# Patient Record
Sex: MALE | Race: WHITE | NOT HISPANIC OR LATINO | Employment: FULL TIME | URBAN - METROPOLITAN AREA
[De-identification: names, ages, dates, MRNs, and addresses within clinical notes are randomized per-mention and may not be internally consistent; named-entity substitution may affect disease eponyms.]

---

## 2017-02-24 ENCOUNTER — OFFICE VISIT (OUTPATIENT)
Dept: MEDICAL GROUP | Facility: CLINIC | Age: 52
End: 2017-02-24
Payer: COMMERCIAL

## 2017-02-24 VITALS
HEART RATE: 82 BPM | HEIGHT: 71 IN | RESPIRATION RATE: 16 BRPM | DIASTOLIC BLOOD PRESSURE: 80 MMHG | OXYGEN SATURATION: 95 % | SYSTOLIC BLOOD PRESSURE: 140 MMHG | TEMPERATURE: 97.2 F | WEIGHT: 156 LBS | BODY MASS INDEX: 21.84 KG/M2

## 2017-02-24 DIAGNOSIS — J30.0 VASOMOTOR RHINITIS: ICD-10-CM

## 2017-02-24 DIAGNOSIS — R05.3 PERSISTENT COUGH FOR 3 WEEKS OR LONGER: ICD-10-CM

## 2017-02-24 PROCEDURE — 99214 OFFICE O/P EST MOD 30 MIN: CPT | Performed by: FAMILY MEDICINE

## 2017-02-24 RX ORDER — AZELASTINE 1 MG/ML
1 SPRAY, METERED NASAL 2 TIMES DAILY
Qty: 1 BOTTLE | Refills: 6 | Status: SHIPPED | OUTPATIENT
Start: 2017-02-24 | End: 2018-01-10

## 2017-02-24 ASSESSMENT — PATIENT HEALTH QUESTIONNAIRE - PHQ9: CLINICAL INTERPRETATION OF PHQ2 SCORE: 0

## 2017-02-24 NOTE — MR AVS SNAPSHOT
"        Jay Tenorioashleigh   2017 8:40 AM   Office Visit   MRN: [id number]    Department:  [facility]   Dept Phone:  [phone]    Description:  Male : 1965   Provider:  Jessica Fu M.D.           Reason for Visit     Cough           Allergies as of 2017     No Known Allergies      You were diagnosed with     Persistent cough for 3 weeks or longer   [[id number]]       Vasomotor rhinitis   [[id number]]         Vital Signs     Blood Pressure Pulse Temperature Respirations Height Weight    140/80 mmHg 82 36.2 °C (97.2 °F) 16 1.803 m (5' 11\") 70.761 kg (156 lb)    Body Mass Index Oxygen Saturation Smoking Status             21.77 kg/m2 95% Never Smoker          Basic Information     Date Of Birth Sex Race Ethnicity Preferred Language    1965 Male White Non- English      Problem List              ICD-10-CM Priority Class Noted - Resolved    HTN (hypertension) I10   2013 - Present    Dyslipidemia E78.5   2013 - Present      Health Maintenance        Date Due Completion Dates    IMM DTaP/Tdap/Td Vaccine (1 - Tdap) 1986 ---    IMM INFLUENZA (1) 3/1/2018 (Originally 2016) ---            Current Immunizations     No immunizations on file.      Below and/or attached are the medications your provider expects you to take. Review all of your home medications and newly ordered medications with your provider and/or pharmacist. Follow medication instructions as directed by your provider and/or pharmacist. Please keep your medication list with you and share with your provider. Update the information when medications are discontinued, doses are changed, or new medications (including over-the-counter products) are added; and carry medication information at all times in the event of emergency situations     Allergies:  No Known Allergies          Medications  Valid as of: 2017 -  9:24 AM    Generic Name Brand Name Tablet Size Instructions for use    Azelastine HCl " (Solution) ASTELIN 137 MCG/SPRAY Spray 1 Spray in nose 2 times a day.        Coenzyme Q10 (Cap) Coenzyme Q10 100 MG Take 1 Cap by mouth 1 time daily as needed.        Fluticasone Propionate (Suspension) FLONASE 50 MCG/ACT Spray 1 Spray in nose every day. Each Nostril        Losartan Potassium (Tab) COZAAR 50 MG TAKE 1 TABLET BY MOUTH EVERY DAY.        Pravastatin Sodium (Tab) PRAVACHOL 10 MG TAKE 1 TABLET BY MOUTH EVERY DAY.        Triamterene-HCTZ (Tab) MAXZIDE-25/DYAZIDE 37.5-25 MG TAKE 1 TABLET BY MOUTH EVERY DAY.        .                 Medicines prescribed today were sent to:     [facility]Y #[id number] - [place], [place] - [address]    [address] [place] [address]    Phone: [phone] Fax: [phone]    Open 24 Hours?: No      Medication refill instructions:       If your prescription bottle indicates you have medication refills left, it is not necessary to call your provider’s office. Please contact your pharmacy and they will refill your medication.    If your prescription bottle indicates you do not have any refills left, you may request refills at any time through one of the following ways: The online Zigmo system (except Urgent Care), by calling your provider’s office, or by asking your pharmacy to contact your provider’s office with a refill request. Medication refills are processed only during regular business hours and may not be available until the next business day. Your provider may request additional information or to have a follow-up visit with you prior to refilling your medication.   *Please Note: Medication refills are assigned a new Rx number when refilled electronically. Your pharmacy may indicate that no refills were authorized even though a new prescription for the same medication is available at the pharmacy. Please request the medicine by name with the pharmacy before contacting your provider for a refill.        Your To Do List     Future Labs/Procedures Complete By Expires      DX-CHEST-2 VIEWS  As directed 2/24/2018         Kidblogt Access Code: Activation code not generated  Current Vindicia Status: Active

## 2017-02-24 NOTE — PROGRESS NOTES
CC: Persistent cough, nasal drainage    HPI:   Jay presents today with the following.    1. Persistent cough for 3 weeks or longer  Cough since January 28.  He arrived home from [facility] on 1/26/17.  The cough is dry, not productive.  He is aware of postnasal drainage though he has had that intermittently before. Denies fever or chills. Denies malaise. Denies hematuria. He is a regular runner and finds that after his runs he has a fit of coughing that lasts many minutes. He notes no increase in fatigue or difficulty with his running. He denies any increase in shortness of breath. Denies any diaphoresis. Denies any unusual bruising.    2. Vasomotor rhinitis  He has had congestion and nasal drainage now for about a year. He is having this no matter where he travels or what climate he is in. It is nearly daily. There is sometimes a tingling nor sneezing feeling in the nose that seems to set this off. He saw Dr. Wong who began him on Flonase. He has been using the Flonase only intermittently a day or 2 a month. Discussed that this is going to have better effect if he uses it steadily for at least 10 days. Discussed the risks and pros and cons. Discussed alternative azelastine which would also need to be taken steadily over at least a couple weeks.      Patient Active Problem List    Diagnosis Date Noted   • Dyslipidemia 02/12/2013   • HTN (hypertension) 01/22/2013       Current Outpatient Prescriptions   Medication Sig Dispense Refill   • triamterene-hctz (MAXZIDE-25/DYAZIDE) 37.5-25 MG Tab TAKE 1 TABLET BY MOUTH EVERY DAY. 30 Tab 11   • losartan (COZAAR) 50 MG Tab TAKE 1 TABLET BY MOUTH EVERY DAY. 30 Tab 11   • pravastatin (PRAVACHOL) 10 MG TABS TAKE 1 TABLET BY MOUTH EVERY DAY. 30 Tab 10   • Coenzyme Q10 (CO Q-10) 100 MG CAPS Take 1 Cap by mouth 1 time daily as needed. 30 Each 0   • fluticasone (FLONASE) 50 MCG/ACT nasal spray Spray 1 Spray in nose every day. Each Nostril 16 g 11     No current  "facility-administered medications for this visit.         Allergies as of 02/24/2017   • (No Known Allergies)        ROS: As per HPI.    /80 mmHg  Pulse 82  Temp(Src) 36.2 °C (97.2 °F)  Resp 16  Ht 1.803 m (5' 11\")  Wt 70.761 kg (156 lb)  BMI 21.77 kg/m2  SpO2 95%    Physical Exam:  Gen:         Alert and oriented, No apparent distress.  Lucid and fluent.  HEENT:   EOMI, PERRLA, oropharynx well hydrated, posterior pharyngeal streaking.  Nasal mucosa moderate erythema, no ulcerations.  Neck:       Supple, no cervical adenopathy, no carotid bruits.    Lungs:     Clear to auscultation bilaterally, good air movement  CV:          Regular rate and rhythm. No murmurs, rubs or gallops. Heart sounds are crisp and regular.              Ext:          No clubbing, cyanosis, edema.      Assessment and Plan.   51 y.o. male with the following issues.    1. Persistent cough for 3 weeks or longer  Lungs were very clear and there is no suggestion clinically of pneumonia at this time.  This may be triggered primarily from the drainage. He will treat that but if that does not improve he will get the chest x-ray.  - DX-CHEST-2 VIEWS; Future    2. Vasomotor rhinitis  He will actually try his Flonase that he has at home for 10 days. If this does not improve the situation he will switch to the azelastine. He will let us know if she gets any epistaxis or if the symptoms are not improving.  - azelastine (ASTELIN) 137 MCG/SPRAY nasal spray; Spray 1 Spray in nose 2 times a day.  Dispense: 1 Bottle; Refill: 6            "

## 2017-04-14 RX ORDER — LOSARTAN POTASSIUM 50 MG/1
TABLET ORAL
Qty: 30 TAB | Refills: 11 | Status: SHIPPED | OUTPATIENT
Start: 2017-04-14 | End: 2018-05-12 | Stop reason: SDUPTHER

## 2017-08-31 ENCOUNTER — TELEPHONE (OUTPATIENT)
Dept: MEDICAL GROUP | Facility: CLINIC | Age: 52
End: 2017-08-31

## 2017-08-31 NOTE — TELEPHONE ENCOUNTER
Future Appointments       Provider Department Center    9/1/2017 9:40 AM Emory Wong D.O. [facility]          ESTABLISHED PATIENT PRE-VISIT PLANNING     Note: Patient will not be contacted if there is no indication to call. PT was not Contacted.    1.    Reviewed note from last office visit with PCP: YES Last office visit: 2/24/07    2.  If any orders were placed at last visit, do we have Results/Consult Notes?        •  Labs - Labs were not ordered at last office visit.        •  Imaging - Imaging ordered, NOT completed. Patient advised to complete prior to next appointment. CXR       •  Referrals - No referrals were ordered at last office visit.     3.  Immunizations were updated in Epic using WebIZ?: Epic matches WebIZ       •  Web Iz Recommendations:   MMR   Td (adult), adsorbed   CPOX (Varicella)   Influenza w/preserv.         4.  Patient is due for the following Health Maintenance Topics:   There are no preventive care reminders to display for this patient.        5.  Patient was not informed to arrive 15 min prior to their scheduled appointment and bring in their medication bottles.

## 2017-09-01 ENCOUNTER — OFFICE VISIT (OUTPATIENT)
Dept: MEDICAL GROUP | Facility: CLINIC | Age: 52
End: 2017-09-01
Payer: COMMERCIAL

## 2017-09-01 VITALS
BODY MASS INDEX: 21.29 KG/M2 | HEIGHT: 71 IN | OXYGEN SATURATION: 98 % | TEMPERATURE: 97.5 F | HEART RATE: 80 BPM | RESPIRATION RATE: 18 BRPM | SYSTOLIC BLOOD PRESSURE: 126 MMHG | WEIGHT: 152.1 LBS | DIASTOLIC BLOOD PRESSURE: 78 MMHG

## 2017-09-01 DIAGNOSIS — R19.7 DIARRHEA, UNSPECIFIED TYPE: ICD-10-CM

## 2017-09-01 DIAGNOSIS — Z12.11 SCREENING FOR COLON CANCER: ICD-10-CM

## 2017-09-01 DIAGNOSIS — Z12.5 SCREENING FOR PROSTATE CANCER: ICD-10-CM

## 2017-09-01 DIAGNOSIS — Z13.220 SCREENING CHOLESTEROL LEVEL: ICD-10-CM

## 2017-09-01 DIAGNOSIS — R63.4 WEIGHT LOSS: ICD-10-CM

## 2017-09-01 PROCEDURE — 99214 OFFICE O/P EST MOD 30 MIN: CPT | Performed by: INTERNAL MEDICINE

## 2017-09-06 ENCOUNTER — HOSPITAL ENCOUNTER (OUTPATIENT)
Dept: LAB | Facility: MEDICAL CENTER | Age: 52
End: 2017-09-06
Attending: INTERNAL MEDICINE
Payer: COMMERCIAL

## 2017-09-06 DIAGNOSIS — Z13.220 SCREENING CHOLESTEROL LEVEL: ICD-10-CM

## 2017-09-06 DIAGNOSIS — Z12.5 SCREENING FOR PROSTATE CANCER: ICD-10-CM

## 2017-09-06 DIAGNOSIS — R63.4 WEIGHT LOSS: ICD-10-CM

## 2017-09-06 LAB
ALBUMIN SERPL BCP-MCNC: 4.4 G/DL (ref 3.2–4.9)
ALBUMIN/GLOB SERPL: 1.4 G/DL
ALP SERPL-CCNC: 55 U/L (ref 30–99)
ALT SERPL-CCNC: 20 U/L (ref 2–50)
ANION GAP SERPL CALC-SCNC: 9 MMOL/L (ref 0–11.9)
AST SERPL-CCNC: 18 U/L (ref 12–45)
BASOPHILS # BLD AUTO: 1.3 % (ref 0–1.8)
BASOPHILS # BLD: 0.06 K/UL (ref 0–0.12)
BILIRUB SERPL-MCNC: 1.2 MG/DL (ref 0.1–1.5)
BUN SERPL-MCNC: 12 MG/DL (ref 8–22)
CALCIUM SERPL-MCNC: 9.8 MG/DL (ref 8.5–10.5)
CHLORIDE SERPL-SCNC: 103 MMOL/L (ref 96–112)
CHOLEST SERPL-MCNC: 189 MG/DL (ref 100–199)
CO2 SERPL-SCNC: 27 MMOL/L (ref 20–33)
CREAT SERPL-MCNC: 0.94 MG/DL (ref 0.5–1.4)
EOSINOPHIL # BLD AUTO: 0.09 K/UL (ref 0–0.51)
EOSINOPHIL NFR BLD: 2 % (ref 0–6.9)
ERYTHROCYTE [DISTWIDTH] IN BLOOD BY AUTOMATED COUNT: 39.4 FL (ref 35.9–50)
FASTING STATUS PATIENT QL REPORTED: NORMAL
GFR SERPL CREATININE-BSD FRML MDRD: >60 ML/MIN/1.73 M 2
GLOBULIN SER CALC-MCNC: 3.1 G/DL (ref 1.9–3.5)
GLUCOSE SERPL-MCNC: 85 MG/DL (ref 65–99)
HCT VFR BLD AUTO: 48.8 % (ref 42–52)
HDLC SERPL-MCNC: 42 MG/DL
HGB BLD-MCNC: 16.6 G/DL (ref 14–18)
IMM GRANULOCYTES # BLD AUTO: 0.01 K/UL (ref 0–0.11)
IMM GRANULOCYTES NFR BLD AUTO: 0.2 % (ref 0–0.9)
LDLC SERPL CALC-MCNC: 130 MG/DL
LYMPHOCYTES # BLD AUTO: 1.8 K/UL (ref 1–4.8)
LYMPHOCYTES NFR BLD: 40.2 % (ref 22–41)
MCH RBC QN AUTO: 29.1 PG (ref 27–33)
MCHC RBC AUTO-ENTMCNC: 34 G/DL (ref 33.7–35.3)
MCV RBC AUTO: 85.6 FL (ref 81.4–97.8)
MONOCYTES # BLD AUTO: 0.29 K/UL (ref 0–0.85)
MONOCYTES NFR BLD AUTO: 6.5 % (ref 0–13.4)
NEUTROPHILS # BLD AUTO: 2.23 K/UL (ref 1.82–7.42)
NEUTROPHILS NFR BLD: 49.8 % (ref 44–72)
NRBC # BLD AUTO: 0 K/UL
NRBC BLD AUTO-RTO: 0 /100 WBC
PLATELET # BLD AUTO: 228 K/UL (ref 164–446)
PMV BLD AUTO: 11.1 FL (ref 9–12.9)
POTASSIUM SERPL-SCNC: 3.9 MMOL/L (ref 3.6–5.5)
PROT SERPL-MCNC: 7.5 G/DL (ref 6–8.2)
PSA SERPL-MCNC: 1.09 NG/ML (ref 0–4)
RBC # BLD AUTO: 5.7 M/UL (ref 4.7–6.1)
SODIUM SERPL-SCNC: 139 MMOL/L (ref 135–145)
T4 FREE SERPL-MCNC: 1 NG/DL (ref 0.53–1.43)
TRIGL SERPL-MCNC: 86 MG/DL (ref 0–149)
TSH SERPL DL<=0.005 MIU/L-ACNC: 1.86 UIU/ML (ref 0.3–3.7)
WBC # BLD AUTO: 4.5 K/UL (ref 4.8–10.8)

## 2017-09-06 PROCEDURE — 84443 ASSAY THYROID STIM HORMONE: CPT

## 2017-09-06 PROCEDURE — 84153 ASSAY OF PSA TOTAL: CPT

## 2017-09-06 PROCEDURE — 80053 COMPREHEN METABOLIC PANEL: CPT

## 2017-09-06 PROCEDURE — 85025 COMPLETE CBC W/AUTO DIFF WBC: CPT

## 2017-09-06 PROCEDURE — 80061 LIPID PANEL: CPT

## 2017-09-06 PROCEDURE — 84439 ASSAY OF FREE THYROXINE: CPT

## 2017-09-06 PROCEDURE — 36415 COLL VENOUS BLD VENIPUNCTURE: CPT

## 2017-09-14 ENCOUNTER — HOSPITAL ENCOUNTER (OUTPATIENT)
Facility: MEDICAL CENTER | Age: 52
End: 2017-09-14
Attending: INTERNAL MEDICINE
Payer: COMMERCIAL

## 2017-09-14 DIAGNOSIS — R19.7 DIARRHEA, UNSPECIFIED TYPE: ICD-10-CM

## 2017-09-14 PROCEDURE — 87177 OVA AND PARASITES SMEARS: CPT

## 2017-09-14 PROCEDURE — 87493 C DIFF AMPLIFIED PROBE: CPT

## 2017-09-15 LAB
C DIFF DNA SPEC QL NAA+PROBE: NEGATIVE
C DIFF TOX GENS STL QL NAA+PROBE: NEGATIVE

## 2017-09-19 LAB
O+P SPEC MICRO: NORMAL
SIGNIFICANT IND 70042: NORMAL
SOURCE SOURCE: NORMAL

## 2017-10-09 ENCOUNTER — OFFICE VISIT (OUTPATIENT)
Dept: MEDICAL GROUP | Facility: CLINIC | Age: 52
End: 2017-10-09
Payer: COMMERCIAL

## 2017-10-09 VITALS
DIASTOLIC BLOOD PRESSURE: 88 MMHG | HEART RATE: 68 BPM | BODY MASS INDEX: 21.49 KG/M2 | HEIGHT: 71 IN | SYSTOLIC BLOOD PRESSURE: 135 MMHG | OXYGEN SATURATION: 97 % | TEMPERATURE: 97.9 F | RESPIRATION RATE: 16 BRPM | WEIGHT: 153.5 LBS

## 2017-10-09 DIAGNOSIS — Z12.11 SCREENING FOR COLON CANCER: ICD-10-CM

## 2017-10-09 DIAGNOSIS — R10.13 DYSPEPSIA: ICD-10-CM

## 2017-10-09 DIAGNOSIS — E78.5 DYSLIPIDEMIA: ICD-10-CM

## 2017-10-09 PROCEDURE — 99214 OFFICE O/P EST MOD 30 MIN: CPT | Performed by: INTERNAL MEDICINE

## 2017-10-09 NOTE — PROGRESS NOTES
CC: Jay Murphy is a 52 y.o. male is suffering from   Chief Complaint   Patient presents with   • Follow-Up         SUBJECTIVE:  1. Dyslipidemia  Patient's here for follow-up, continues to struggle with dyslipidemia. We've discussed CT cardiac scoring. Patient is very physically active, but has a strong family history of heart disease..     2. Screening for colon cancer  Patient need of screening for colon cancer referral written.     3. Dyspepsia  Patient with a history of dyspepsia we've discussed various options including watchful waiting, concerned about possible IBS discussed FODMAP diet with the patient        Past social, family, history: Single  Social History   Substance Use Topics   • Smoking status: Never Smoker   • Smokeless tobacco: Never Used   • Alcohol use No         MEDICATIONS:    Current Outpatient Prescriptions:   •  losartan (COZAAR) 50 MG Tab, TAKE 1 TABLET BY MOUTH EVERY DAY., Disp: 30 Tab, Rfl: 11  •  triamterene-hctz (MAXZIDE-25/DYAZIDE) 37.5-25 MG Tab, TAKE 1 TABLET BY MOUTH EVERY DAY., Disp: 30 Tab, Rfl: 11  •  azelastine (ASTELIN) 137 MCG/SPRAY nasal spray, Spray 1 Spray in nose 2 times a day., Disp: 1 Bottle, Rfl: 6  •  pravastatin (PRAVACHOL) 10 MG TABS, TAKE 1 TABLET BY MOUTH EVERY DAY., Disp: 30 Tab, Rfl: 10  •  Coenzyme Q10 (CO Q-10) 100 MG CAPS, Take 1 Cap by mouth 1 time daily as needed., Disp: 30 Each, Rfl: 0  •  fluticasone (FLONASE) 50 MCG/ACT nasal spray, Spray 1 Spray in nose every day. Each Nostril, Disp: 16 g, Rfl: 11    PROBLEMS:  Patient Active Problem List    Diagnosis Date Noted   • Dyslipidemia 02/12/2013   • HTN (hypertension) 01/22/2013       REVIEW OF SYSTEMS:  Gen.:  No Nausea, Vomiting, fever, Chills.  Heart: No chest pain.  Lungs:  No shortness of Breath.  Psychological: Pantera unusual Anxiety depression     PHYSICAL EXAM   Constitutional: Alert, cooperative, not in acute distress.  Cardiovascular:  Rate Rhythm is regular without murmurs rubs clicks.     Thorax  "& Lungs: Clear to auscultation, no wheezing, rhonchi, or rales  HENT: Normocephalic, Atraumatic.  Eyes: PERRLA, EOMI, Conjunctiva normal.   Neck: Trachia is midline no swelling of the thyroid.   Lymphatic: No lymphadenopathy noted.   Abdomin: Soft non-tender, no rebound, no guarding.   Neurologic: Alert & oriented x 3, cranial nerves II through XII are intact, Normal motor function, Normal sensory function, No focal deficits noted.   Psychiatric: Affect normal, Judgment normal, Mood normal.     VITAL SIGNS:/88   Pulse 68   Temp 36.6 °C (97.9 °F)   Resp 16   Ht 1.803 m (5' 11\")   Wt 69.6 kg (153 lb 8 oz)   SpO2 97%   BMI 21.41 kg/m²     Labs: Reviewed    Assessment:                                                     Plan:    1. Dyslipidemia  CT cardiac scoring  - CT-CARDIAC SCORING; Future    2. Screening for colon cancer  Referral rewritten  - REFERRAL TO GI FOR COLONOSCOPY    3. Dyspepsia  Patient is being evaluated by gastroenterology will await the results        "

## 2017-10-23 ENCOUNTER — HOSPITAL ENCOUNTER (OUTPATIENT)
Dept: RADIOLOGY | Facility: MEDICAL CENTER | Age: 52
End: 2017-10-23
Attending: INTERNAL MEDICINE
Payer: COMMERCIAL

## 2017-10-23 DIAGNOSIS — E78.5 DYSLIPIDEMIA: ICD-10-CM

## 2017-10-23 PROCEDURE — 4410556 CT-CARDIAC SCORING

## 2017-10-23 RX ORDER — PRAVASTATIN SODIUM 20 MG
20 TABLET ORAL DAILY
Qty: 90 TAB | Refills: 3 | Status: SHIPPED | OUTPATIENT
Start: 2017-10-23 | End: 2018-01-10

## 2017-11-06 DIAGNOSIS — I10 ESSENTIAL HYPERTENSION: ICD-10-CM

## 2017-11-06 RX ORDER — TRIAMTERENE AND HYDROCHLOROTHIAZIDE 37.5; 25 MG/1; MG/1
TABLET ORAL
Qty: 30 TAB | Refills: 11 | Status: SHIPPED | OUTPATIENT
Start: 2017-11-06 | End: 2018-12-07 | Stop reason: SDUPTHER

## 2017-12-20 ENCOUNTER — OFFICE VISIT (OUTPATIENT)
Dept: MEDICAL GROUP | Facility: CLINIC | Age: 52
End: 2017-12-20
Payer: COMMERCIAL

## 2017-12-20 VITALS
OXYGEN SATURATION: 96 % | WEIGHT: 153.3 LBS | DIASTOLIC BLOOD PRESSURE: 68 MMHG | SYSTOLIC BLOOD PRESSURE: 134 MMHG | RESPIRATION RATE: 18 BRPM | HEART RATE: 90 BPM | TEMPERATURE: 97.8 F | HEIGHT: 71 IN | BODY MASS INDEX: 21.46 KG/M2

## 2017-12-20 DIAGNOSIS — I10 HYPERTENSION, UNSPECIFIED TYPE: ICD-10-CM

## 2017-12-20 DIAGNOSIS — E78.5 DYSLIPIDEMIA: ICD-10-CM

## 2017-12-20 DIAGNOSIS — T75.3XXA SEA SICKNESS, INITIAL ENCOUNTER: ICD-10-CM

## 2017-12-20 DIAGNOSIS — Z78.9 STATIN INTOLERANCE: ICD-10-CM

## 2017-12-20 PROCEDURE — 99214 OFFICE O/P EST MOD 30 MIN: CPT | Performed by: INTERNAL MEDICINE

## 2017-12-20 RX ORDER — SCOLOPAMINE TRANSDERMAL SYSTEM 1 MG/1
1 PATCH, EXTENDED RELEASE TRANSDERMAL
Qty: 4 PATCH | Refills: 0 | Status: SHIPPED | OUTPATIENT
Start: 2017-12-20 | End: 2019-08-27

## 2017-12-20 NOTE — PROGRESS NOTES
CC: Jay Murphy is a 52 y.o. male is suffering from No chief complaint on file.        SUBJECTIVE:  1. Hypertension, unspecified type  Patient's here for follow-up continues to suffer from hypertension, is clinically stable    2. Dyslipidemia  Patient and I discussed these having problems with his cholesterol, has an elevated CT cardiac score of recommended we be seen by cardiology    3. Statin intolerance  Patient was statin intolerance has problems with musculoskeletal pain associated with use of Pravachol    4. Sea sickness, initial encounter  Patient will be traveling to [place], scopolamine patch written for        Past social, family, history: Single  Social History   Substance Use Topics   • Smoking status: Never Smoker   • Smokeless tobacco: Never Used   • Alcohol use No         MEDICATIONS:    Current Outpatient Prescriptions:   •  scopolamine (TRANSDERM-SCOP) 1 MG/3DAYS PATCH 72 HR, Apply 1 Patch to skin as directed every 72 hours., Disp: 4 Patch, Rfl: 0  •  triamterene-hctz (MAXZIDE-25/DYAZIDE) 37.5-25 MG Tab, TAKE 1 TABLET BY MOUTH EVERY DAY., Disp: 30 Tab, Rfl: 11  •  losartan (COZAAR) 50 MG Tab, TAKE 1 TABLET BY MOUTH EVERY DAY., Disp: 30 Tab, Rfl: 11  •  pravastatin (PRAVACHOL) 20 MG Tab, Take 1 Tab by mouth every day., Disp: 90 Tab, Rfl: 3  •  azelastine (ASTELIN) 137 MCG/SPRAY nasal spray, Spray 1 Spray in nose 2 times a day., Disp: 1 Bottle, Rfl: 6  •  Coenzyme Q10 (CO Q-10) 100 MG CAPS, Take 1 Cap by mouth 1 time daily as needed., Disp: 30 Each, Rfl: 0  •  fluticasone (FLONASE) 50 MCG/ACT nasal spray, Spray 1 Spray in nose every day. Each Nostril, Disp: 16 g, Rfl: 11    PROBLEMS:  Patient Active Problem List    Diagnosis Date Noted   • Statin intolerance 12/20/2017   • Dyslipidemia 02/12/2013   • HTN (hypertension) 01/22/2013       REVIEW OF SYSTEMS:  Gen.:  No Nausea, Vomiting, fever, Chills.  Heart: No chest pain.  Lungs:  No shortness of Breath.  Psychological: Pantera unusual  "Anxiety depression     PHYSICAL EXAM   Constitutional: Alert, cooperative, not in acute distress.  Cardiovascular:  Rate Rhythm is regular without murmurs rubs clicks.     Thorax & Lungs: Clear to auscultation, no wheezing, rhonchi, or rales  HENT: Normocephalic, Atraumatic.  Eyes: PERRLA, EOMI, Conjunctiva normal.   Neck: Trachia is midline no swelling of the thyroid.   Lymphatic: No lymphadenopathy noted.   Neurologic: Alert & oriented x 3, cranial nerves II through XII are intact, Normal motor function, Normal sensory function, No focal deficits noted.   Psychiatric: Affect normal, Judgment normal, Mood normal.     VITAL SIGNS:/68   Pulse 90   Temp 36.6 °C (97.8 °F)   Resp 18   Ht 1.803 m (5' 11\")   Wt 69.5 kg (153 lb 4.8 oz)   SpO2 96%   BMI 21.38 kg/m²     Labs: Reviewed    Assessment:                                                     Plan:    1. Hypertension, unspecified type  Hypertension clinically stable    2. Dyslipidemia  Elevated cholesterol with high CT cardiac score referral written to cardiology  - REFERRAL TO CARDIOLOGY    3. Statin intolerance  Patient is unable to tolerate Pravachol will need additional medical therapy initiated  - REFERRAL TO CARDIOLOGY    4. Sea sickness, initial encounter  Patient is to follow-up at a travel clinic for which she has an appointment today, patient is to be given scopolamine patches  - scopolamine (TRANSDERM-SCOP) 1 MG/3DAYS PATCH 72 HR; Apply 1 Patch to skin as directed every 72 hours.  Dispense: 4 Patch; Refill: 0        "

## 2018-01-10 ENCOUNTER — OFFICE VISIT (OUTPATIENT)
Dept: CARDIOLOGY | Facility: MEDICAL CENTER | Age: 53
End: 2018-01-10
Payer: COMMERCIAL

## 2018-01-10 VITALS
BODY MASS INDEX: 20.59 KG/M2 | HEIGHT: 72 IN | SYSTOLIC BLOOD PRESSURE: 120 MMHG | WEIGHT: 152 LBS | HEART RATE: 84 BPM | DIASTOLIC BLOOD PRESSURE: 80 MMHG

## 2018-01-10 DIAGNOSIS — I10 ESSENTIAL HYPERTENSION: ICD-10-CM

## 2018-01-10 DIAGNOSIS — I25.10 CORONARY ARTERY CALCIFICATION: ICD-10-CM

## 2018-01-10 DIAGNOSIS — E78.5 DYSLIPIDEMIA: ICD-10-CM

## 2018-01-10 DIAGNOSIS — I25.84 CORONARY ARTERY CALCIFICATION: ICD-10-CM

## 2018-01-10 LAB — EKG IMPRESSION: NORMAL

## 2018-01-10 PROCEDURE — 99203 OFFICE O/P NEW LOW 30 MIN: CPT | Performed by: INTERNAL MEDICINE

## 2018-01-10 PROCEDURE — 93000 ELECTROCARDIOGRAM COMPLETE: CPT | Performed by: INTERNAL MEDICINE

## 2018-01-10 RX ORDER — ROSUVASTATIN CALCIUM 5 MG/1
5 TABLET, COATED ORAL EVERY EVENING
Qty: 30 TAB | Refills: 11 | Status: SHIPPED | OUTPATIENT
Start: 2018-01-10 | End: 2019-06-11

## 2018-01-10 ASSESSMENT — ENCOUNTER SYMPTOMS
NERVOUS/ANXIOUS: 0
COUGH: 0
BRUISES/BLEEDS EASILY: 0
NAUSEA: 0
CHILLS: 0
SHORTNESS OF BREATH: 0
DIZZINESS: 0
MYALGIAS: 0
BLURRED VISION: 0
EYE DISCHARGE: 0
PND: 0
HEADACHES: 0
FEVER: 0
DEPRESSION: 0
HEARTBURN: 0
PALPITATIONS: 0

## 2018-01-10 NOTE — LETTER
[facility]   [address]   [address]  [place] [address]  Phone: [phone]  Fax: [phone]              Jay Murphy  1965    Encounter Date: 1/10/2018    Jay Schwartz M.D.          PROGRESS NOTE:  Subjective:   Jay Murphy is a 52 y.o. male who presents today     Chief Complaint: I have high cholesterol    This patient seen in consultation at the request of Dr. Wong for a coronary artery calcification with a score of 356 and LDL cholesterol of 130.  In the past has tried pravastatin 10 mg per day and after several weeks he developed left forearm musculoskeletal pain. Pravastatin was stopped. It took 2-3 months for the arm pain to disappear.  He is willing to try different statin.  Physically very active including frequent episodes of jogging several times a week.  No symptoms of heart disease.  Family history: Father required bypass surgery in his mid 60s  Social history: No tobacco no illicit drugs. He is a  at [employer]. Exercise consists of running    Past Medical History:   Diagnosis Date   • Hyperlipidemia    • Hypertension      History reviewed. No pertinent surgical history.  History reviewed. No pertinent family history.  History   Smoking Status   • Never Smoker   Smokeless Tobacco   • Never Used     No Known Allergies  Outpatient Encounter Prescriptions as of 1/10/2018   Medication Sig Dispense Refill   • rosuvastatin (CRESTOR) 5 MG Tab Take 1 Tab by mouth every evening. 30 Tab 11   • scopolamine (TRANSDERM-SCOP) 1 MG/3DAYS PATCH 72 HR Apply 1 Patch to skin as directed every 72 hours. 4 Patch 0   • triamterene-hctz (MAXZIDE-25/DYAZIDE) 37.5-25 MG Tab TAKE 1 TABLET BY MOUTH EVERY DAY. 30 Tab 11   • losartan (COZAAR) 50 MG Tab TAKE 1 TABLET BY MOUTH EVERY DAY. 30 Tab 11   • [DISCONTINUED] pravastatin (PRAVACHOL) 20 MG Tab Take 1 Tab by mouth every day. 90 Tab 3   • [DISCONTINUED]  azelastine (ASTELIN) 137 MCG/SPRAY nasal spray Simpson 1 Spray in nose 2 times a day. 1 Bottle 6   • Coenzyme Q10 (CO Q-10) 100 MG CAPS Take 1 Cap by mouth 1 time daily as needed. 30 Each 0   • fluticasone (FLONASE) 50 MCG/ACT nasal spray Spray 1 Spray in nose every day. Each Nostril 16 g 11     No facility-administered encounter medications on file as of 1/10/2018.      Review of Systems   Constitutional: Negative for chills, fever and malaise/fatigue.   Eyes: Negative for blurred vision and discharge.   Respiratory: Negative for cough and shortness of breath.    Cardiovascular: Negative for chest pain, palpitations, leg swelling and PND.   Gastrointestinal: Negative for heartburn and nausea.   Genitourinary: Negative for dysuria and urgency.   Musculoskeletal: Negative for myalgias.   Skin: Negative for itching and rash.   Neurological: Negative for dizziness and headaches.   Endo/Heme/Allergies: Negative for environmental allergies. Does not bruise/bleed easily.   Psychiatric/Behavioral: Negative for depression. The patient is not nervous/anxious.         Objective:   /80   Pulse 84   Ht 1.829 m (6')   Wt 68.9 kg (152 lb)   BMI 20.61 kg/m²      Physical Exam   Constitutional: He is oriented to person, place, and time. He appears well-developed and well-nourished.   HENT:   Head: Normocephalic and atraumatic.   Eyes: Conjunctivae and EOM are normal. No scleral icterus.   Neck: Neck supple. No JVD present. No thyromegaly present.   Cardiovascular: Normal rate, regular rhythm and normal heart sounds.  Exam reveals no gallop and no friction rub.    No murmur heard.  Pulmonary/Chest: Effort normal and breath sounds normal. No respiratory distress. He has no wheezes. He has no rales. He exhibits no tenderness.   Abdominal: Soft. Bowel sounds are normal. He exhibits no distension and no mass. There is no tenderness.   Neurological: He is alert and oriented to person, place, and time. Coordination normal.   Skin:  Skin is warm and dry. No rash noted. No pallor.   Psychiatric: He has a normal mood and affect. His behavior is normal. Judgment and thought content normal.       Assessment:     1. Essential hypertension  EKG   2. Dyslipidemia  CPK - TOTAL SERUM    COMP METABOLIC PANEL    LIPID PANEL   3. Coronary artery calcification         Medical Decision Making:  Today's Assessment / Status / Plan:   We discussed various options for other statins.  We have agreed that he'll try Crestor 5 mg every other day.  Lab work including CPK in 2 months.  Consider CAT scan in 2-4 years.  No indications for stress testing at this time.  I do not plan to follow him routinely.  Thank you for this consultation      Emory Wong D.O.  [address]  [place] [address]  VIA In Basket

## 2018-01-10 NOTE — PROGRESS NOTES
Subjective:   Jay Murphy is a 52 y.o. male who presents today     Chief Complaint: I have high cholesterol    This patient seen in consultation at the request of Dr. Wong for a coronary artery calcification with a score of 356 and LDL cholesterol of 130.  In the past has tried pravastatin 10 mg per day and after several weeks he developed left forearm musculoskeletal pain. Pravastatin was stopped. It took 2-3 months for the arm pain to disappear.  He is willing to try different statin.  Physically very active including frequent episodes of jogging several times a week.  No symptoms of heart disease.  Family history: Father required bypass surgery in his mid 60s  Social history: No tobacco no illicit drugs. He is a  at [employer]. Exercise consists of running    Past Medical History:   Diagnosis Date   • Hyperlipidemia    • Hypertension      History reviewed. No pertinent surgical history.  History reviewed. No pertinent family history.  History   Smoking Status   • Never Smoker   Smokeless Tobacco   • Never Used     No Known Allergies  Outpatient Encounter Prescriptions as of 1/10/2018   Medication Sig Dispense Refill   • rosuvastatin (CRESTOR) 5 MG Tab Take 1 Tab by mouth every evening. 30 Tab 11   • scopolamine (TRANSDERM-SCOP) 1 MG/3DAYS PATCH 72 HR Apply 1 Patch to skin as directed every 72 hours. 4 Patch 0   • triamterene-hctz (MAXZIDE-25/DYAZIDE) 37.5-25 MG Tab TAKE 1 TABLET BY MOUTH EVERY DAY. 30 Tab 11   • losartan (COZAAR) 50 MG Tab TAKE 1 TABLET BY MOUTH EVERY DAY. 30 Tab 11   • [DISCONTINUED] pravastatin (PRAVACHOL) 20 MG Tab Take 1 Tab by mouth every day. 90 Tab 3   • [DISCONTINUED] azelastine (ASTELIN) 137 MCG/SPRAY nasal spray [place] 1 Spray in nose 2 times a day. 1 Bottle 6   • Coenzyme Q10 (CO Q-10) 100 MG CAPS Take 1 Cap by mouth 1 time daily as needed. 30 Each 0   • fluticasone (FLONASE) 50 MCG/ACT nasal spray Spray 1 Spray in nose every day. Each Nostril 16 g  11     No facility-administered encounter medications on file as of 1/10/2018.      Review of Systems   Constitutional: Negative for chills, fever and malaise/fatigue.   Eyes: Negative for blurred vision and discharge.   Respiratory: Negative for cough and shortness of breath.    Cardiovascular: Negative for chest pain, palpitations, leg swelling and PND.   Gastrointestinal: Negative for heartburn and nausea.   Genitourinary: Negative for dysuria and urgency.   Musculoskeletal: Negative for myalgias.   Skin: Negative for itching and rash.   Neurological: Negative for dizziness and headaches.   Endo/Heme/Allergies: Negative for environmental allergies. Does not bruise/bleed easily.   Psychiatric/Behavioral: Negative for depression. The patient is not nervous/anxious.         Objective:   /80   Pulse 84   Ht 1.829 m (6')   Wt 68.9 kg (152 lb)   BMI 20.61 kg/m²     Physical Exam   Constitutional: He is oriented to person, place, and time. He appears well-developed and well-nourished.   HENT:   Head: Normocephalic and atraumatic.   Eyes: Conjunctivae and EOM are normal. No scleral icterus.   Neck: Neck supple. No JVD present. No thyromegaly present.   Cardiovascular: Normal rate, regular rhythm and normal heart sounds.  Exam reveals no gallop and no friction rub.    No murmur heard.  Pulmonary/Chest: Effort normal and breath sounds normal. No respiratory distress. He has no wheezes. He has no rales. He exhibits no tenderness.   Abdominal: Soft. Bowel sounds are normal. He exhibits no distension and no mass. There is no tenderness.   Neurological: He is alert and oriented to person, place, and time. Coordination normal.   Skin: Skin is warm and dry. No rash noted. No pallor.   Psychiatric: He has a normal mood and affect. His behavior is normal. Judgment and thought content normal.       Assessment:     1. Essential hypertension  EKG   2. Dyslipidemia  CPK - TOTAL SERUM    COMP METABOLIC PANEL    LIPID PANEL    3. Coronary artery calcification         Medical Decision Making:  Today's Assessment / Status / Plan:   We discussed various options for other statins.  We have agreed that he'll try Crestor 5 mg every other day.  Lab work including CPK in 2 months.  Consider CAT scan in 2-4 years.  No indications for stress testing at this time.  I do not plan to follow him routinely.  Thank you for this consultation

## 2018-02-07 ENCOUNTER — HOSPITAL ENCOUNTER (OUTPATIENT)
Dept: LAB | Facility: MEDICAL CENTER | Age: 53
End: 2018-02-07
Attending: INTERNAL MEDICINE
Payer: COMMERCIAL

## 2018-02-07 PROCEDURE — 36415 COLL VENOUS BLD VENIPUNCTURE: CPT

## 2018-02-07 PROCEDURE — 83516 IMMUNOASSAY NONANTIBODY: CPT

## 2018-02-07 PROCEDURE — 82784 ASSAY IGA/IGD/IGG/IGM EACH: CPT

## 2018-02-09 ENCOUNTER — HOSPITAL ENCOUNTER (OUTPATIENT)
Facility: MEDICAL CENTER | Age: 53
End: 2018-02-09
Attending: INTERNAL MEDICINE
Payer: COMMERCIAL

## 2018-02-09 LAB
BODY FLD TYPE: NORMAL
FAT FLD QL: NORMAL
IGA SERPL-MCNC: 149 MG/DL (ref 68–408)
TTG IGA SER IA-ACNC: 0 U/ML (ref 0–3)

## 2018-02-09 PROCEDURE — 89125 SPECIMEN FAT STAIN: CPT

## 2018-05-14 RX ORDER — LOSARTAN POTASSIUM 50 MG/1
TABLET ORAL
Qty: 30 TAB | Refills: 11 | Status: SHIPPED | OUTPATIENT
Start: 2018-05-14 | End: 2019-06-11 | Stop reason: SDUPTHER

## 2018-12-07 DIAGNOSIS — I10 ESSENTIAL HYPERTENSION: ICD-10-CM

## 2018-12-07 RX ORDER — TRIAMTERENE AND HYDROCHLOROTHIAZIDE 37.5; 25 MG/1; MG/1
TABLET ORAL
Qty: 30 TAB | Refills: 8 | Status: SHIPPED | OUTPATIENT
Start: 2018-12-07 | End: 2019-10-21 | Stop reason: SDUPTHER

## 2018-12-07 NOTE — TELEPHONE ENCOUNTER
Was the patient seen in the last year in this department? Yes lov 12/20/17    Does patient have an active prescription for medications requested? No     Received Request Via: Pharmacy

## 2019-06-07 ENCOUNTER — TELEPHONE (OUTPATIENT)
Dept: MEDICAL GROUP | Facility: MEDICAL CENTER | Age: 54
End: 2019-06-07

## 2019-06-07 NOTE — TELEPHONE ENCOUNTER
1. Caller Name: Jay Murphy      Call Back Number: [phone] (home)         Patient approves a detailed voicemail message: N\A      Patient called about a my chart message that sent about his prescription, in regards to making an appointment for a prescription refill. I did schedule him on June 11,2019 at 1 pm, but patient may not be able to make that appointment he has 6 pills left. He is requesting a least a 1 month supply of his losartan (COZAAR) 50 MG Tab.

## 2019-06-11 ENCOUNTER — OFFICE VISIT (OUTPATIENT)
Dept: MEDICAL GROUP | Facility: LAB | Age: 54
End: 2019-06-11
Payer: COMMERCIAL

## 2019-06-11 VITALS
DIASTOLIC BLOOD PRESSURE: 62 MMHG | BODY MASS INDEX: 20.6 KG/M2 | RESPIRATION RATE: 20 BRPM | SYSTOLIC BLOOD PRESSURE: 112 MMHG | TEMPERATURE: 98.5 F | HEART RATE: 81 BPM | WEIGHT: 152.12 LBS | OXYGEN SATURATION: 95 % | HEIGHT: 72 IN

## 2019-06-11 DIAGNOSIS — E78.5 DYSLIPIDEMIA: ICD-10-CM

## 2019-06-11 DIAGNOSIS — I10 ESSENTIAL HYPERTENSION: ICD-10-CM

## 2019-06-11 PROCEDURE — 99214 OFFICE O/P EST MOD 30 MIN: CPT | Performed by: FAMILY MEDICINE

## 2019-06-11 RX ORDER — LOSARTAN POTASSIUM 50 MG/1
50 TABLET ORAL
Qty: 30 TAB | Refills: 0 | Status: SHIPPED | OUTPATIENT
Start: 2019-06-11 | End: 2019-07-12 | Stop reason: SDUPTHER

## 2019-06-11 ASSESSMENT — PATIENT HEALTH QUESTIONNAIRE - PHQ9: CLINICAL INTERPRETATION OF PHQ2 SCORE: 0

## 2019-06-11 NOTE — ASSESSMENT & PLAN NOTE
He felt he had side effects with atorvastatin.  Dr. Schwartz prescribed rosuvastatin but he never started.

## 2019-06-11 NOTE — PATIENT INSTRUCTIONS
Losartan tablets  What is this medicine?  LOSARTAN (carlos LISA tan) is used to treat high blood pressure and to reduce the risk of stroke in certain patients. This drug also slows the progression of kidney disease in patients with diabetes.  This medicine may be used for other purposes; ask your health care provider or pharmacist if you have questions.  COMMON BRAND NAME(S): Cozaar  What should I tell my health care provider before I take this medicine?  They need to know if you have any of these conditions:  -heart failure  -kidney or liver disease  -an unusual or allergic reaction to losartan, other medicines, foods, dyes, or preservatives  -pregnant or trying to get pregnant  -breast-feeding  How should I use this medicine?  Take this medicine by mouth with a glass of water. Follow the directions on the prescription label. This medicine can be taken with or without food. Take your doses at regular intervals. Do not take your medicine more often than directed.  Talk to your pediatrician regarding the use of this medicine in children. Special care may be needed.  Overdosage: If you think you have taken too much of this medicine contact a poison control center or emergency room at once.  NOTE: This medicine is only for you. Do not share this medicine with others.  What if I miss a dose?  If you miss a dose, take it as soon as you can. If it is almost time for your next dose, take only that dose. Do not take double or extra doses.  What may interact with this medicine?  -blood pressure medicines  -diuretics, especially triamterene, spironolactone, or amiloride  -fluconazole  -NSAIDs, medicines for pain and inflammation, like ibuprofen or naproxen  -potassium salts or potassium supplements  -rifampin  This list may not describe all possible interactions. Give your health care provider a list of all the medicines, herbs, non-prescription drugs, or dietary supplements you use. Also tell them if you smoke, drink alcohol, or  use illegal drugs. Some items may interact with your medicine.  What should I watch for while using this medicine?  Visit your doctor or health care professional for regular checks on your progress. Check your blood pressure as directed. Ask your doctor or health care professional what your blood pressure should be and when you should contact him or her. Call your doctor or health care professional if you notice an irregular or fast heart beat.  Women should inform their doctor if they wish to become pregnant or think they might be pregnant. There is a potential for serious side effects to an unborn child, particularly in the second or third trimester. Talk to your health care professional or pharmacist for more information.  You may get drowsy or dizzy. Do not drive, use machinery, or do anything that needs mental alertness until you know how this drug affects you. Do not stand or sit up quickly, especially if you are an older patient. This reduces the risk of dizzy or fainting spells. Alcohol can make you more drowsy and dizzy. Avoid alcoholic drinks.  Avoid salt substitutes unless you are told otherwise by your doctor or health care professional.  Do not treat yourself for coughs, colds, or pain while you are taking this medicine without asking your doctor or health care professional for advice. Some ingredients may increase your blood pressure.  What side effects may I notice from receiving this medicine?  Side effects that you should report to your doctor or health care professional as soon as possible:  -confusion, dizziness, light headedness or fainting spells  -decreased amount of urine passed  -difficulty breathing or swallowing, hoarseness, or tightening of the throat  -fast or irregular heart beat, palpitations, or chest pain  -skin rash, itching  -swelling of your face, lips, tongue, hands, or feet  Side effects that usually do not require medical attention (report to your doctor or health care  professional if they continue or are bothersome):  -cough  -decreased sexual function or desire  -headache  -nasal congestion or stuffiness  -nausea or stomach pain  -sore or cramping muscles  This list may not describe all possible side effects. Call your doctor for medical advice about side effects. You may report side effects to FDA at 1-601-FQC-1650.  Where should I keep my medicine?  Keep out of the reach of children.  Store at room temperature between 15 and 30 degrees C (59 and 86 degrees F). Protect from light. Keep container tightly closed. Throw away any unused medicine after the expiration date.  NOTE: This sheet is a summary. It may not cover all possible information. If you have questions about this medicine, talk to your doctor, pharmacist, or health care provider.  © 2018 [employer]/Gold Standard (2009-02-27 16:42:18)

## 2019-06-11 NOTE — ASSESSMENT & PLAN NOTE
Stable. Currently taking losartan 50 mg as directed.   He is not taking baby aspirin daily.   He is monitoring BP at home.   Denies symptoms low BP: light-headed, tunnel-vision, unusual fatigue.   Denies symptoms high BP:pounding headache, visual changes, palpitations, flushed face.   Denies medicine side effects: unusual fatigue, slow heartbeat, foot/leg swelling, cough.

## 2019-06-11 NOTE — PROGRESS NOTES
Subjective:     Chief Complaint   Patient presents with   • Medication Refill     Losartan     Jay is a regular patient of Dr. Wong's  Jay Murphy is a 53 y.o. male here today for evaluation and management of:    Dyslipidemia  He felt he had side effects with atorvastatin.  Dr. Schwartz prescribed rosuvastatin but he never started.    Essential hypertension  Stable. Currently taking losartan 50 mg as directed.   He is not taking baby aspirin daily.   He is monitoring BP at home.   Denies symptoms low BP: light-headed, tunnel-vision, unusual fatigue.   Denies symptoms high BP:pounding headache, visual changes, palpitations, flushed face.   Denies medicine side effects: unusual fatigue, slow heartbeat, foot/leg swelling, cough.         No Known Allergies    Current medicines (including changes today)  Current Outpatient Prescriptions   Medication Sig Dispense Refill   • losartan (COZAAR) 50 MG Tab Take 1 Tab by mouth every day. 30 Tab 0   • triamterene-hctz (MAXZIDE-25/DYAZIDE) 37.5-25 MG Tab TAKE 1 TABLET BY MOUTH EVERY DAY. 30 Tab 8   • scopolamine (TRANSDERM-SCOP) 1 MG/3DAYS PATCH 72 HR Apply 1 Patch to skin as directed every 72 hours. 4 Patch 0     No current facility-administered medications for this visit.        He  has a past medical history of Hyperlipidemia and Hypertension.    Patient Active Problem List    Diagnosis Date Noted   • Statin intolerance 12/20/2017   • Dyslipidemia 02/12/2013   • Essential hypertension 01/22/2013       ROS   No fever or chills.  No nausea or vomiting.  No chest pain or palpitations.  No cough or SOB.  No pain with urination or hematuria.  No black or bloody stools.       Objective:     /62 (BP Location: Left arm, Patient Position: Sitting, BP Cuff Size: Adult)   Pulse 81   Temp 36.9 °C (98.5 °F) (Temporal)   Resp 20   Ht 1.829 m (6')   Wt 69 kg (152 lb 1.9 oz)   SpO2 95%  Body mass index is 20.63 kg/m².   Physical Exam:  Well developed, well nourished.   Alert, oriented in no acute distress.  Eye contact is good, speech goal directed, affect calm  Eyes: conjunctiva non-injected, sclera non-icteric.  Neck Supple.  No adenopathy or masses in the neck or supraclavicular regions. No thyromegaly  Lungs: clear to auscultation bilaterally with good excursion. No wheezes or rhonchi  CV: regular rate and rhythm. No murmur  Ext: no edema, color normal, vascularity normal, temperature normal          Assessment and Plan:   The following treatment plan was discussed    1. Essential hypertension  This is a new problem for me.  Patient is stable.  Continue losartan 50 mg daily.  Check chemistry panel  - losartan (COZAAR) 50 MG Tab; Take 1 Tab by mouth every day.  Dispense: 30 Tab; Refill: 0  - Comp Metabolic Panel; Future    2. Dyslipidemia  Check lipid panel.  Await results.  Discussed primary prevention of cardiovascular disease with statins.  Patient will consider the rosuvastatin if his numbers are really high.  - Lipid Profile; Future  - TSH; Future    Any change or worsening of signs or symptoms, patient encouraged to follow-up or report to the emergency room for further evaluation. Patient understands and agrees.    Followup: Return in about 1 year (around 6/11/2020).

## 2019-08-27 ENCOUNTER — OFFICE VISIT (OUTPATIENT)
Dept: MEDICAL GROUP | Facility: LAB | Age: 54
End: 2019-08-27
Payer: COMMERCIAL

## 2019-08-27 VITALS
HEART RATE: 65 BPM | BODY MASS INDEX: 20.7 KG/M2 | TEMPERATURE: 97.6 F | SYSTOLIC BLOOD PRESSURE: 120 MMHG | WEIGHT: 152.8 LBS | DIASTOLIC BLOOD PRESSURE: 76 MMHG | OXYGEN SATURATION: 97 % | HEIGHT: 72 IN

## 2019-08-27 DIAGNOSIS — R14.0 ABDOMINAL BLOATING: ICD-10-CM

## 2019-08-27 DIAGNOSIS — Z71.84 TRAVEL ADVICE ENCOUNTER: ICD-10-CM

## 2019-08-27 DIAGNOSIS — R19.4 BOWEL HABIT CHANGES: ICD-10-CM

## 2019-08-27 PROCEDURE — 99214 OFFICE O/P EST MOD 30 MIN: CPT | Performed by: FAMILY MEDICINE

## 2019-08-27 RX ORDER — AZITHROMYCIN 500 MG/1
500 TABLET, FILM COATED ORAL DAILY
Qty: 3 TAB | Refills: 0 | Status: SHIPPED | OUTPATIENT
Start: 2019-08-27 | End: 2022-10-17

## 2019-08-27 NOTE — PATIENT INSTRUCTIONS
"Travelers' Diarrhea  Travelers' diarrhea (TD) is the most common illness affecting travelers. Each year many travelers develop diarrhea. TD usually occurs within the first week of travel. However, it may occur at any time while traveling. It may even occur after returning home. The most important risk factor is where you are going. High-risk places are the developing countries of:  · [place].   · [place].   · [place].   · [place].   High risk people include young adults and those with:  · Transplants.   · HIV infections.   · Medicine that suppresses the immune system.   · Inflammatory-bowel disease.   · Diabetes.   · H-2 blockers or antacids.   Attack rates are similar for men and women. The primary source of TD is eating or drinking food or water tainted with feces (stool or bowel movements).  CAUSES   Infectious agents are the primary cause of TD. Germs cause almost 80% of TD cases. The most common germ produces:  · Watery diarrhea with cramps.   · Low-grade or no fever.   There are many other bacterial, viral and parasitic pathogens (disease causing \"bugs\").   SYMPTOMS   Most TD cases begin suddenly. Symptoms include stool that is increased in:  · Frequency.   · Volume.   · Weight.   Altered stool consistency also is common. Typically, you have four to five loose or watery bowel movements each day. Other common symptoms are:  · Nausea.   · Vomiting.   · Diarrhea.   · Abdominal cramping.   · Bloating.   · Fever.   · Urgency.   · Malaise.   Most cases are not dangerous. Most cases go away in 1-2 days without treatment. TD is rarely life threatening. 90% of cases resolve within 1 week. 98% resolve within 1 month.  PREVENTION   · Avoid foods or beverages purchased from street vendors in high risk countries.   · Avoid food from places where unclean conditions are present.   · Avoid raw or undercooked meat and seafood.   · Avoid raw fruits (e.g., oranges, bananas, avocados) and vegetables unless you peel them " yourself.   · If handled properly, well-cooked and packaged foods usually are safe. Foods associated with increased risk for TD include:   · Tap water.   · Ice.   · Unpasteurized milk.   · Dairy products.   · Safe beverages include:   · Bottled carbonated beverages.   · Hot tea or coffee.   · Beer.   · Wine.   · Boiled water.   · Water treated with iodine or chlorine.   ANTIBIOTICS ARE NOT RECOMMENDED AS PREVENTION  · CDC (Centers for Disease Control) does not recommend antimicrobial drugs (medicine that kill germs) to prevent TD. Several studies show that Pepto-Bismol® taken as either 2 tablets 4 times daily, or 2 fluid ounces 4 times daily, reduces the incidence of travelers' diarrhea. People that should avoid Pepto-Bismol® include those who are:   · Pregnant.   · Allergic to aspirin.   · Taking anticoagulants medicine (probenecid, methotrexate).   · Be informed about potential side effects, in particular about temporary blackening of the tongue and stool, and rarely ringing in the ears. Because of potential adverse side effects, preventative Pepto-Bismol® should not be used for more than 3 weeks.   · Some antibiotics taken in a once-a-day dose are 90% effective at preventing travelers' diarrhea. However, antibiotics are not recommended as prevention. Routine antimicrobial prophylaxis increases your risk for:   · Adverse reactions.   · Infections with resistant organisms.   · Antibiotics can increase your susceptibility to resistant bacterial pathogens and provide no protection against either viral or parasitic pathogens. This can give travelers a false sense of security. As a result, strict adherence to preventive measures is encouraged. Pepto-Bismol® should be used as an extra effort if prophylaxis is needed.   TREATMENT   · TD usually is a self-limited disorder. It gets well without treatment. It often goes away without specific treatment. Oral re-hydration is often helpful to replace lost fluids and  electrolytes. Clear liquids are routinely recommended for adults. You may be helped with antimicrobial therapy if you develop three or more loose stools in an 8-hour period, especially if associated with:   · Nausea.   · Vomiting.   · Abdominal cramps.   · Fever.   · Blood in stools.   · Antibiotics usually are given for 3-5 days. Pepto-Bismol® also may be used as treatment. Take one fluid ounce, or two 262 mg tablets every 30 minutes, for up to 8 doses in a 24-hour period. This can be repeated on a second day. If diarrhea persists despite therapy, you should be evaluated by a caregiver and treated for possible parasitic infection.   · Because drug resistance is a continuing problem and may vary from country to country, professional assistance should be looked for if problems persist.   · Antimotility agents (loperamide, diphenoxylate, and paregoric) mostly reduce diarrhea by slowing down the passage of food and drink in the gut. This allows more time for absorption. Some persons believe diarrhea is the body's defense mechanism to minimize contact time between gut pathogens and lining of the bowel. In several studies, antimotility agents have been useful in treating travelers' diarrhea by decreasing the duration of diarrhea. However, these agents should never be used by persons with fever or bloody diarrhea because they can increase the severity of disease by delaying clearance of causative organisms. Because antimotility agents are now available over the counter, their improper use is of concern. Complications have been reported from the use of these medicines such as:   · Toxic megacolon.   · Sepsis.   · Disseminated intravascular coagulation.   SEEK IMMEDIATE MEDICAL CARE IF:   · You are unable to keep fluids down.   · Vomiting or diarrhea becomes persistent.   · Abdominal (belly) pain develops or increases or localizes. (Right sided pain can be appendicitis and left sided pain in adults can be diverticulitis).    · You develop an oral temperature above 102° F (38.9° C), or as your caregiver suggests.   · Diarrhea becomes excessive or contains blood or mucous.   · Excessive weakness, dizziness, fainting or extreme thirst.   · Checking weight 2 to 3 times per day in babies and children will help verify adequate fluid replacement. Your caregiver will tell you what loss should concern you or suggest another visit to your personal physician.   · Record your weight or your child's weight today. Compare this to your home scale and record all weights and time and date weighed. Try to check weight at the same times every day. Bring this chart to your caregivers if you or your child needs to be seen again.   FOR MORE INFORMATION   Travelers should consult with a caregiver before departing on a trip abroad. Information about TD is available from:  · Your local or state health departments.   · World Health Organization (WHO).   Other information that may be of interest to travelers can be found at the CDC Travelers' Health homepage at http://www.cdc.gov/travel.  Document Released: 12/08/2003 Document Revised: 03/11/2013 Document Reviewed: 02/25/2010  [employer]® Patient Information ©2013 [employer].

## 2019-08-27 NOTE — PROGRESS NOTES
"Subjective:     Chief Complaint   Patient presents with   • GI Problem     discomfort    Jay is regular patient of Dr. Wong's    Jay Jeffrey is a 53 y.o. male here today for evaluation and management of:    Abdominal bloating  2 years ate some \"bad chicken\" and got vomiting and diarrhea.  Since then he has had more bloating and stools that are looser than normal and floating. He is still having persistent symptoms.  Probiotics help a little.  He has dropped 5 lbs without trying.  He is active and runs.  He has never had a colonoscopy.  No real pain.    Results for JAY LOJA (MRN [id number]) as of 8/27/2019 08:37   Ref. Range 9/14/2017 07:00   027-NAP1-BI Presumptive Latest Ref Range: Negative  Negative   C Diff by PCR Latest Ref Range: Negative  Negative   Ova And Parasites Unknown No Ova or Parasit...   Significant Indicator Unknown NEG   Source Unknown [facility]       Travel advice encounter  Going to [place] to hike the [place]       No Known Allergies    Current medicines (including changes today)  Current Outpatient Medications   Medication Sig Dispense Refill   • azithromycin (ZITHROMAX) 500 MG tablet Take 1 Tab by mouth every day. 3 Tab 0   • losartan (COZAAR) 50 MG Tab TAKE 1 TABLET BY MOUTH EVERY DAY 90 Tab 3   • triamterene-hctz (MAXZIDE-25/DYAZIDE) 37.5-25 MG Tab TAKE 1 TABLET BY MOUTH EVERY DAY. 30 Tab 8     No current facility-administered medications for this visit.        He  has a past medical history of Hyperlipidemia and Hypertension.    Patient Active Problem List    Diagnosis Date Noted   • Abdominal bloating 08/27/2019   • Bowel habit changes 08/27/2019   • Travel advice encounter 08/27/2019   • Statin intolerance 12/20/2017   • Dyslipidemia 02/12/2013   • Essential hypertension 01/22/2013       ROS   No fever or chills.  No nausea or vomiting.  No chest pain or palpitations.  No cough or SOB.  No pain with urination or hematuria.  No black or bloody stools.       Objective:     BP " 120/76 (BP Location: Left arm, Patient Position: Sitting)   Pulse 65   Temp 36.4 °C (97.6 °F) (Temporal)   Ht 1.829 m (6')   Wt 69.3 kg (152 lb 12.8 oz)   SpO2 97%  Body mass index is 20.72 kg/m².   Physical Exam:  Well developed, well nourished.  Alert, oriented in no acute distress.  Eye contact is good, speech goal directed, affect calm  Eyes: conjunctiva non-injected, sclera non-icteric.  Neck Supple.  No adenopathy or masses in the neck or supraclavicular regions. No thyromegaly  Lungs: clear to auscultation bilaterally with good excursion. No wheezes or rhonchi  CV: regular rate and rhythm. No murmur  Abdomen: soft, nontender, no masses or organomegaly.  No rebound or gaurding          Assessment and Plan:   The following treatment plan was discussed    1. Abdominal bloating  Defer to gastroenterology for further evaluation and treatment.  CBC and chemistry panel to assess  - REFERRAL TO GASTROENTEROLOGY  - CBC WITH DIFFERENTIAL; Future    2. Bowel habit changes  Defer to gastroenterology for further evaluation and treatment.  CBC and chemistry panel to assess  - REFERRAL TO GASTROENTEROLOGY  - CBC WITH DIFFERENTIAL; Future    3. Travel advice encounter  Patient traveling to [place].  Given Zithromax to take for traveler's diarrhea as needed.  - azithromycin (ZITHROMAX) 500 MG tablet; Take 1 Tab by mouth every day.  Dispense: 3 Tab; Refill: 0    Any change or worsening of signs or symptoms, patient encouraged to follow-up or report to the emergency room for further evaluation. Patient understands and agrees.    Followup: Return if symptoms worsen or fail to improve.

## 2019-08-27 NOTE — ASSESSMENT & PLAN NOTE
"2 years ate some \"bad chicken\" and got vomiting and diarrhea.  Since then he has had more bloating and stools that are looser than normal and floating. He is still having persistent symptoms.  Probiotics help a little.  He has dropped 5 lbs without trying.  He is active and runs.  He has never had a colonoscopy.  No real pain.    Results for JOLANTA LOJA (MRN [id number]) as of 8/27/2019 08:37   Ref. Range 9/14/2017 07:00   027-NAP1-BI Presumptive Latest Ref Range: Negative  Negative   C Diff by PCR Latest Ref Range: Negative  Negative   Ova And Parasites Unknown No Ova or Parasit...   Significant Indicator Unknown NEG   Source Unknown [facility]     "

## 2019-09-03 ENCOUNTER — HOSPITAL ENCOUNTER (OUTPATIENT)
Dept: LAB | Facility: MEDICAL CENTER | Age: 54
End: 2019-09-03
Attending: FAMILY MEDICINE
Payer: COMMERCIAL

## 2019-09-03 DIAGNOSIS — R14.0 ABDOMINAL BLOATING: ICD-10-CM

## 2019-09-03 DIAGNOSIS — I10 ESSENTIAL HYPERTENSION: ICD-10-CM

## 2019-09-03 DIAGNOSIS — R19.4 BOWEL HABIT CHANGES: ICD-10-CM

## 2019-09-03 DIAGNOSIS — E78.5 DYSLIPIDEMIA: ICD-10-CM

## 2019-09-03 LAB
ALBUMIN SERPL BCP-MCNC: 4.6 G/DL (ref 3.2–4.9)
ALBUMIN/GLOB SERPL: 1.6 G/DL
ALP SERPL-CCNC: 48 U/L (ref 30–99)
ALT SERPL-CCNC: 18 U/L (ref 2–50)
ANION GAP SERPL CALC-SCNC: 9 MMOL/L (ref 0–11.9)
AST SERPL-CCNC: 17 U/L (ref 12–45)
BASOPHILS # BLD AUTO: 1.3 % (ref 0–1.8)
BASOPHILS # BLD: 0.07 K/UL (ref 0–0.12)
BILIRUB SERPL-MCNC: 1 MG/DL (ref 0.1–1.5)
BUN SERPL-MCNC: 17 MG/DL (ref 8–22)
CALCIUM SERPL-MCNC: 9.8 MG/DL (ref 8.5–10.5)
CHLORIDE SERPL-SCNC: 103 MMOL/L (ref 96–112)
CHOLEST SERPL-MCNC: 237 MG/DL (ref 100–199)
CO2 SERPL-SCNC: 25 MMOL/L (ref 20–33)
CREAT SERPL-MCNC: 0.97 MG/DL (ref 0.5–1.4)
EOSINOPHIL # BLD AUTO: 0.08 K/UL (ref 0–0.51)
EOSINOPHIL NFR BLD: 1.5 % (ref 0–6.9)
ERYTHROCYTE [DISTWIDTH] IN BLOOD BY AUTOMATED COUNT: 39.6 FL (ref 35.9–50)
FASTING STATUS PATIENT QL REPORTED: NORMAL
GLOBULIN SER CALC-MCNC: 2.8 G/DL (ref 1.9–3.5)
GLUCOSE SERPL-MCNC: 85 MG/DL (ref 65–99)
HCT VFR BLD AUTO: 48 % (ref 42–52)
HDLC SERPL-MCNC: 47 MG/DL
HGB BLD-MCNC: 15.7 G/DL (ref 14–18)
IMM GRANULOCYTES # BLD AUTO: 0.01 K/UL (ref 0–0.11)
IMM GRANULOCYTES NFR BLD AUTO: 0.2 % (ref 0–0.9)
LDLC SERPL CALC-MCNC: 171 MG/DL
LYMPHOCYTES # BLD AUTO: 1.78 K/UL (ref 1–4.8)
LYMPHOCYTES NFR BLD: 33 % (ref 22–41)
MCH RBC QN AUTO: 28.3 PG (ref 27–33)
MCHC RBC AUTO-ENTMCNC: 32.7 G/DL (ref 33.7–35.3)
MCV RBC AUTO: 86.5 FL (ref 81.4–97.8)
MONOCYTES # BLD AUTO: 0.31 K/UL (ref 0–0.85)
MONOCYTES NFR BLD AUTO: 5.7 % (ref 0–13.4)
NEUTROPHILS # BLD AUTO: 3.15 K/UL (ref 1.82–7.42)
NEUTROPHILS NFR BLD: 58.3 % (ref 44–72)
NRBC # BLD AUTO: 0 K/UL
NRBC BLD-RTO: 0 /100 WBC
PLATELET # BLD AUTO: 232 K/UL (ref 164–446)
PMV BLD AUTO: 10.7 FL (ref 9–12.9)
POTASSIUM SERPL-SCNC: 3.6 MMOL/L (ref 3.6–5.5)
PROT SERPL-MCNC: 7.4 G/DL (ref 6–8.2)
RBC # BLD AUTO: 5.55 M/UL (ref 4.7–6.1)
SODIUM SERPL-SCNC: 137 MMOL/L (ref 135–145)
TRIGL SERPL-MCNC: 93 MG/DL (ref 0–149)
TSH SERPL DL<=0.005 MIU/L-ACNC: 1.44 UIU/ML (ref 0.38–5.33)
WBC # BLD AUTO: 5.4 K/UL (ref 4.8–10.8)

## 2019-09-03 PROCEDURE — 85025 COMPLETE CBC W/AUTO DIFF WBC: CPT

## 2019-09-03 PROCEDURE — 80053 COMPREHEN METABOLIC PANEL: CPT

## 2019-09-03 PROCEDURE — 36415 COLL VENOUS BLD VENIPUNCTURE: CPT

## 2019-09-03 PROCEDURE — 84443 ASSAY THYROID STIM HORMONE: CPT

## 2019-09-03 PROCEDURE — 80061 LIPID PANEL: CPT

## 2019-10-21 DIAGNOSIS — I10 ESSENTIAL HYPERTENSION: ICD-10-CM

## 2019-10-21 RX ORDER — TRIAMTERENE AND HYDROCHLOROTHIAZIDE 37.5; 25 MG/1; MG/1
TABLET ORAL
Qty: 30 TAB | Refills: 8 | Status: SHIPPED | OUTPATIENT
Start: 2019-10-21 | End: 2020-09-28

## 2019-10-21 NOTE — TELEPHONE ENCOUNTER
Was the patient seen in the last year in this department? Yes lov 8/27/19    Does patient have an active prescription for medications requested? No     Received Request Via: Pharmacy

## 2020-07-18 DIAGNOSIS — I10 ESSENTIAL HYPERTENSION: ICD-10-CM

## 2020-07-20 RX ORDER — LOSARTAN POTASSIUM 50 MG/1
TABLET ORAL
Qty: 30 TAB | Refills: 11 | Status: SHIPPED | OUTPATIENT
Start: 2020-07-20 | End: 2021-08-18

## 2020-07-20 NOTE — TELEPHONE ENCOUNTER
Received request via: Pharmacy    Was the patient seen in the last year in this department? Yes8/27/19    Does the patient have an active prescription (recently filled or refills available) for medication(s) requested? No

## 2020-09-28 DIAGNOSIS — I10 ESSENTIAL HYPERTENSION: ICD-10-CM

## 2020-09-28 RX ORDER — TRIAMTERENE AND HYDROCHLOROTHIAZIDE 37.5; 25 MG/1; MG/1
TABLET ORAL
Qty: 30 TAB | Refills: 8 | Status: SHIPPED | OUTPATIENT
Start: 2020-09-28 | End: 2021-08-17

## 2021-07-26 ENCOUNTER — PATIENT MESSAGE (OUTPATIENT)
Dept: MEDICAL GROUP | Facility: LAB | Age: 56
End: 2021-07-26

## 2021-07-26 NOTE — TELEPHONE ENCOUNTER
From: Jay Murphy  To: Physician Jannet Adams  Sent: 7/26/2021 1:23 PM PDT  Subject: Prescription Question    Due to a change in my prescription insurance plan, I need to switch pharmacies that fill my prescriptions.     The current pharmacy and address is:    [facility] [address] [place] [address]    Please switch to the following pharmacy to fill my prescriptions:    [facility]  [id number] [facility] [place], [address]    Thank you.

## 2021-08-17 DIAGNOSIS — I10 ESSENTIAL HYPERTENSION: ICD-10-CM

## 2021-08-17 RX ORDER — TRIAMTERENE AND HYDROCHLOROTHIAZIDE 37.5; 25 MG/1; MG/1
TABLET ORAL
Qty: 30 TABLET | Refills: 8 | Status: SHIPPED | OUTPATIENT
Start: 2021-08-17 | End: 2021-08-20 | Stop reason: SDUPTHER

## 2021-08-17 NOTE — TELEPHONE ENCOUNTER
Received request via: Pharmacy    Was the patient seen in the last year in this department? No   LOV: 8/27/2019  Does the patient have an active prescription (recently filled or refills available) for medication(s) requested? No

## 2021-08-20 DIAGNOSIS — I10 ESSENTIAL HYPERTENSION: ICD-10-CM

## 2021-08-20 RX ORDER — LOSARTAN POTASSIUM 50 MG/1
50 TABLET ORAL
Qty: 30 TABLET | Refills: 0 | Status: SHIPPED | OUTPATIENT
Start: 2021-08-20 | End: 2021-10-20

## 2021-08-20 RX ORDER — TRIAMTERENE AND HYDROCHLOROTHIAZIDE 37.5; 25 MG/1; MG/1
1 TABLET ORAL
Qty: 30 TABLET | Refills: 8 | Status: SHIPPED | OUTPATIENT
Start: 2021-08-20 | End: 2022-11-01 | Stop reason: SDUPTHER

## 2021-08-20 NOTE — TELEPHONE ENCOUNTER
----- Message from Jay Murphy sent at 8/17/2021  9:26 AM PDT -----  Regarding: RE: Prescription Question  Contact: [phone]  Hi Veronica,  I just got notification that my Losartan and Triamterene were refilled. However, I was notified that the refills were filled at [facility] and not [facility].  Once you changed the preferred pharmacy in your system, do I need to contact the preferred pharmacy or is it automatic that prescriptions should go to the preferred pharmacy?    Thanks,    Jay

## 2021-10-19 DIAGNOSIS — I10 ESSENTIAL HYPERTENSION: ICD-10-CM

## 2021-10-20 RX ORDER — LOSARTAN POTASSIUM 50 MG/1
TABLET ORAL
Qty: 90 TABLET | Refills: 3 | Status: SHIPPED | OUTPATIENT
Start: 2021-10-20 | End: 2023-01-16 | Stop reason: SDUPTHER

## 2021-10-20 NOTE — TELEPHONE ENCOUNTER
Received request via: Pharmacy    Was the patient seen in the last year in this department? NO- 8/27/2019    Does the patient have an active prescription (recently filled or refills available) for medication(s) requested? No

## 2022-09-02 ENCOUNTER — TELEPHONE (OUTPATIENT)
Dept: SCHEDULING | Facility: IMAGING CENTER | Age: 57
End: 2022-09-02

## 2022-10-16 SDOH — ECONOMIC STABILITY: HOUSING INSECURITY

## 2022-10-16 SDOH — ECONOMIC STABILITY: HOUSING INSECURITY
IN THE LAST 12 MONTHS, WAS THERE A TIME WHEN YOU DID NOT HAVE A STEADY PLACE TO SLEEP OR SLEPT IN A SHELTER (INCLUDING NOW)?: PATIENT REFUSED

## 2022-10-16 SDOH — HEALTH STABILITY: MENTAL HEALTH
STRESS IS WHEN SOMEONE FEELS TENSE, NERVOUS, ANXIOUS, OR CAN'T SLEEP AT NIGHT BECAUSE THEIR MIND IS TROUBLED. HOW STRESSED ARE YOU?: ONLY A LITTLE

## 2022-10-16 SDOH — ECONOMIC STABILITY: TRANSPORTATION INSECURITY
IN THE PAST 12 MONTHS, HAS THE LACK OF TRANSPORTATION KEPT YOU FROM MEDICAL APPOINTMENTS OR FROM GETTING MEDICATIONS?: PATIENT DECLINED

## 2022-10-16 SDOH — ECONOMIC STABILITY: FOOD INSECURITY: WITHIN THE PAST 12 MONTHS, YOU WORRIED THAT YOUR FOOD WOULD RUN OUT BEFORE YOU GOT MONEY TO BUY MORE.: PATIENT DECLINED

## 2022-10-16 SDOH — ECONOMIC STABILITY: INCOME INSECURITY: HOW HARD IS IT FOR YOU TO PAY FOR THE VERY BASICS LIKE FOOD, HOUSING, MEDICAL CARE, AND HEATING?: PATIENT DECLINED

## 2022-10-16 SDOH — HEALTH STABILITY: PHYSICAL HEALTH: ON AVERAGE, HOW MANY DAYS PER WEEK DO YOU ENGAGE IN MODERATE TO STRENUOUS EXERCISE (LIKE A BRISK WALK)?: 4 DAYS

## 2022-10-16 SDOH — ECONOMIC STABILITY: TRANSPORTATION INSECURITY
IN THE PAST 12 MONTHS, HAS LACK OF RELIABLE TRANSPORTATION KEPT YOU FROM MEDICAL APPOINTMENTS, MEETINGS, WORK OR FROM GETTING THINGS NEEDED FOR DAILY LIVING?: PATIENT DECLINED

## 2022-10-16 SDOH — ECONOMIC STABILITY: FOOD INSECURITY: WITHIN THE PAST 12 MONTHS, THE FOOD YOU BOUGHT JUST DIDN'T LAST AND YOU DIDN'T HAVE MONEY TO GET MORE.: PATIENT DECLINED

## 2022-10-16 SDOH — ECONOMIC STABILITY: INCOME INSECURITY: IN THE LAST 12 MONTHS, WAS THERE A TIME WHEN YOU WERE NOT ABLE TO PAY THE MORTGAGE OR RENT ON TIME?: PATIENT REFUSED

## 2022-10-16 SDOH — HEALTH STABILITY: PHYSICAL HEALTH: ON AVERAGE, HOW MANY MINUTES DO YOU ENGAGE IN EXERCISE AT THIS LEVEL?: 30 MIN

## 2022-10-16 SDOH — ECONOMIC STABILITY: TRANSPORTATION INSECURITY
IN THE PAST 12 MONTHS, HAS LACK OF TRANSPORTATION KEPT YOU FROM MEETINGS, WORK, OR FROM GETTING THINGS NEEDED FOR DAILY LIVING?: PATIENT DECLINED

## 2022-10-16 ASSESSMENT — LIFESTYLE VARIABLES
AUDIT-C TOTAL SCORE: 2
HOW MANY STANDARD DRINKS CONTAINING ALCOHOL DO YOU HAVE ON A TYPICAL DAY: 1 OR 2
HOW OFTEN DO YOU HAVE SIX OR MORE DRINKS ON ONE OCCASION: NEVER
HOW OFTEN DO YOU HAVE A DRINK CONTAINING ALCOHOL: 2-4 TIMES A MONTH
SKIP TO QUESTIONS 9-10: 1

## 2022-10-16 ASSESSMENT — SOCIAL DETERMINANTS OF HEALTH (SDOH)
IN A TYPICAL WEEK, HOW MANY TIMES DO YOU TALK ON THE PHONE WITH FAMILY, FRIENDS, OR NEIGHBORS?: PATIENT DECLINED
HOW MANY DRINKS CONTAINING ALCOHOL DO YOU HAVE ON A TYPICAL DAY WHEN YOU ARE DRINKING: 1 OR 2
ARE YOU MARRIED, WIDOWED, DIVORCED, SEPARATED, NEVER MARRIED, OR LIVING WITH A PARTNER?: NEVER MARRIED
WITHIN THE PAST 12 MONTHS, YOU WORRIED THAT YOUR FOOD WOULD RUN OUT BEFORE YOU GOT THE MONEY TO BUY MORE: PATIENT DECLINED
HOW OFTEN DO YOU GET TOGETHER WITH FRIENDS OR RELATIVES?: PATIENT DECLINED
HOW OFTEN DO YOU ATTEND CHURCH OR RELIGIOUS SERVICES?: PATIENT DECLINED
HOW OFTEN DO YOU HAVE SIX OR MORE DRINKS ON ONE OCCASION: NEVER
HOW HARD IS IT FOR YOU TO PAY FOR THE VERY BASICS LIKE FOOD, HOUSING, MEDICAL CARE, AND HEATING?: PATIENT DECLINED
HOW OFTEN DO YOU ATTENT MEETINGS OF THE CLUB OR ORGANIZATION YOU BELONG TO?: PATIENT DECLINED
DO YOU BELONG TO ANY CLUBS OR ORGANIZATIONS SUCH AS CHURCH GROUPS UNIONS, FRATERNAL OR ATHLETIC GROUPS, OR SCHOOL GROUPS?: NO
HOW OFTEN DO YOU GET TOGETHER WITH FRIENDS OR RELATIVES?: PATIENT DECLINED
ARE YOU MARRIED, WIDOWED, DIVORCED, SEPARATED, NEVER MARRIED, OR LIVING WITH A PARTNER?: NEVER MARRIED
HOW OFTEN DO YOU ATTENT MEETINGS OF THE CLUB OR ORGANIZATION YOU BELONG TO?: PATIENT DECLINED
HOW OFTEN DO YOU ATTEND CHURCH OR RELIGIOUS SERVICES?: PATIENT DECLINED
HOW OFTEN DO YOU HAVE A DRINK CONTAINING ALCOHOL: 2-4 TIMES A MONTH
IN A TYPICAL WEEK, HOW MANY TIMES DO YOU TALK ON THE PHONE WITH FAMILY, FRIENDS, OR NEIGHBORS?: PATIENT DECLINED
DO YOU BELONG TO ANY CLUBS OR ORGANIZATIONS SUCH AS CHURCH GROUPS UNIONS, FRATERNAL OR ATHLETIC GROUPS, OR SCHOOL GROUPS?: NO

## 2022-10-17 ENCOUNTER — OFFICE VISIT (OUTPATIENT)
Dept: MEDICAL GROUP | Facility: LAB | Age: 57
End: 2022-10-17
Payer: COMMERCIAL

## 2022-10-17 VITALS
BODY MASS INDEX: 21.9 KG/M2 | OXYGEN SATURATION: 98 % | SYSTOLIC BLOOD PRESSURE: 126 MMHG | WEIGHT: 153 LBS | DIASTOLIC BLOOD PRESSURE: 78 MMHG | TEMPERATURE: 96.7 F | RESPIRATION RATE: 15 BRPM | HEIGHT: 70 IN | HEART RATE: 91 BPM

## 2022-10-17 DIAGNOSIS — Z12.11 SCREENING FOR COLORECTAL CANCER: ICD-10-CM

## 2022-10-17 DIAGNOSIS — E78.5 DYSLIPIDEMIA: ICD-10-CM

## 2022-10-17 DIAGNOSIS — I10 ESSENTIAL HYPERTENSION: ICD-10-CM

## 2022-10-17 DIAGNOSIS — R93.1 AGATSTON CORONARY ARTERY CALCIUM SCORE BETWEEN 200 AND 399: ICD-10-CM

## 2022-10-17 DIAGNOSIS — M54.50 CHRONIC LEFT-SIDED LOW BACK PAIN WITHOUT SCIATICA: ICD-10-CM

## 2022-10-17 DIAGNOSIS — Z12.5 ENCOUNTER FOR SCREENING FOR MALIGNANT NEOPLASM OF PROSTATE: ICD-10-CM

## 2022-10-17 DIAGNOSIS — Z12.12 SCREENING FOR COLORECTAL CANCER: ICD-10-CM

## 2022-10-17 DIAGNOSIS — Z76.89 ENCOUNTER TO ESTABLISH CARE: ICD-10-CM

## 2022-10-17 DIAGNOSIS — G89.29 CHRONIC LEFT-SIDED LOW BACK PAIN WITHOUT SCIATICA: ICD-10-CM

## 2022-10-17 PROCEDURE — 99396 PREV VISIT EST AGE 40-64: CPT | Performed by: FAMILY MEDICINE

## 2022-10-17 ASSESSMENT — ENCOUNTER SYMPTOMS
NERVOUS/ANXIOUS: 0
SHORTNESS OF BREATH: 0
FEVER: 0
DIARRHEA: 0
DEPRESSION: 0
CONSTIPATION: 0
ABDOMINAL PAIN: 0
NAUSEA: 0
WHEEZING: 0
CHILLS: 0
PALPITATIONS: 0
BLURRED VISION: 0
VOMITING: 0

## 2022-10-17 ASSESSMENT — PATIENT HEALTH QUESTIONNAIRE - PHQ9: CLINICAL INTERPRETATION OF PHQ2 SCORE: 0

## 2022-10-17 NOTE — PROGRESS NOTES
Subjective:   Jay Murphy is a 57 y.o. male here today for   Chief Complaint   Patient presents with    Establish Care     #Establish care   -Reviewed all past medical history, family history, social history.  -Reviewed all screening/vaccinations: Due for labs including PSA, lipid profile, colonoscopy.  -Diet and Exercise: Regular exercise, running almost daily, exercising almost daily.  Normal diet with no concerns.  -Tobacco, alcohol, recreational drug use: rare, denies tobacco, drug use.   -Established dental home  -Sexually active: Monogamous with partner, no concerns    #HTN  Doing well.  Reviewed labs with the patient. Taking medications as prescribed. No chest pain palpitations or shortness of breath. No headache loss or change in vision.    #Hyperlipidemia   -Previous elevated cholesterol levels. Previously prescribed pravastatin but discontinued due to concerns of myalgias.  Wasprescribed rosuvastatin but never started.  CT calcium scan completed 2017 shows an elevated score above 300.  He is asymptomatic at this time but given these findings as well as elevated cholesterol on most recent labs he would like to recheck cholesterol levels and discuss restarting statin at that time.    #Back pain:  -For the last 2 months patient has been having sharp, intermittent pain in the left lower back.  Pain is nonradiating.  Improved with standing, exercise.  We will become more intermittent when sitting for long periods of time.  He does state that his work on stretches, does feel improved but still continues to be a lingering problem.  Denies any acute trauma, no pertinent past medical history.     No Known Allergies      Current medicines (including changes today)  Current Outpatient Medications   Medication Sig Dispense Refill    losartan (COZAAR) 50 MG Tab TAKE ONE TABLET BY MOUTH EVERY DAY 90 Tablet 3    triamterene-hctz (MAXZIDE-25/DYAZIDE) 37.5-25 MG Tab Take 1 Tablet by mouth every day. 30 Tablet 8     No  "current facility-administered medications for this visit.     He  has a past medical history of Hyperlipidemia and Hypertension.    ROS   Review of Systems   Constitutional:  Negative for chills and fever.   HENT:  Negative for hearing loss.    Eyes:  Negative for blurred vision.   Respiratory:  Negative for shortness of breath and wheezing.    Cardiovascular:  Negative for chest pain and palpitations.   Gastrointestinal:  Negative for abdominal pain, constipation, diarrhea, nausea and vomiting.   Psychiatric/Behavioral:  Negative for depression. The patient is not nervous/anxious.         Objective:     Physical Exam:  /78   Pulse 91   Temp 35.9 °C (96.7 °F) (Temporal)   Resp 15   Ht 1.778 m (5' 10\")   Wt 69.4 kg (153 lb)   SpO2 98%  Body mass index is 21.95 kg/m².   Constitutional: Alert, no distress.  Skin: Warm, dry, good turgor, no rashes in visible areas.  Eye: Equal, round and reactive, conjunctiva clear, lids normal.  ENMT: TM's clear bilaterally, lips without lesions, good dentition, oropharynx clear.  Neck: Trachea midline, no masses, no thyromegaly. No cervical or supraclavicular lymphadenopathy.  Respiratory: Unlabored respiratory effort, lungs clear to auscultation, no wheezes, no rhonchi.  Cardiovascular: Normal S1, S2, no murmur, no edema.  Abdomen: Soft, non-tender, no masses, no hepatosplenomegaly.  Psych: Alert and oriented x3, normal affect and mood.    Assessment and Plan:     1. Encounter to establish care  -All questions concerns were answered at this time.  -Vaccinations/screenings needed at this time: Unable to complete vaccines at this time.  We will place referral for colonoscopy.  Check labs as below including PSA.  -Labs reviewed, no concerns, check labs as below.  -Discussed the importance of a healthy, Mediterranean style diet, routine exercise regimen.  -Discussed general safety measures including seatbelts, helmets, avoidance of smoking, sunscreen, hydration.  -Follow-up " for general physical exam on a yearly basis, sooner if needed.  - CBC WITHOUT DIFFERENTIAL; Future  - Comp Metabolic Panel; Future    2. Dyslipidemia  -Given patient's history of elevated cholesterol with an elevated Agatston score, statin medication is indicated at this time.  Patient would first like to recheck lipid profile before considering.  Discussed appropriate diet and exercise regiment.  Discussed risk, benefits, alternatives of starting statin.  He will follow-up with me after lipid profile completed.  - Lipid Profile; Future    3. Agatston coronary artery calcium score between 200 and 399  -See #2.    4. Chronic left-sided low back pain without sciatica  -We will refer to physical therapy for further evaluation and treatment of back pain.  Most likely muscular in nature.  No signs of any radiculopathy, no red flag symptoms.  Continue with conservative treatment measures  - Referral to Physical Therapy    5. Essential hypertension  -Stable, blood pressure at goal.  Continue all medications.  Check labs as below.  - Comp Metabolic Panel; Future    6. Encounter for screening for malignant neoplasm of prostate  - PROSTATE SPECIFIC AG SCREENING; Future    7. Screening for colorectal cancer  - Referral to GI for Colonoscopy          Followup: No follow-ups on file.         PLEASE NOTE: This dictation was created using voice recognition software. I have made every reasonable attempt to correct obvious errors, but I expect that there are errors of grammar and possibly content that I did not discover before finalizing the note.

## 2022-10-27 ENCOUNTER — OFFICE VISIT (OUTPATIENT)
Dept: MEDICAL GROUP | Facility: LAB | Age: 57
End: 2022-10-27
Payer: COMMERCIAL

## 2022-10-27 VITALS
OXYGEN SATURATION: 97 % | RESPIRATION RATE: 14 BRPM | SYSTOLIC BLOOD PRESSURE: 104 MMHG | BODY MASS INDEX: 21.9 KG/M2 | WEIGHT: 153 LBS | DIASTOLIC BLOOD PRESSURE: 66 MMHG | TEMPERATURE: 96.9 F | HEIGHT: 70 IN | HEART RATE: 76 BPM

## 2022-10-27 DIAGNOSIS — H61.23 BILATERAL IMPACTED CERUMEN: ICD-10-CM

## 2022-10-27 DIAGNOSIS — J30.2 SEASONAL ALLERGIES: ICD-10-CM

## 2022-10-27 PROCEDURE — 99213 OFFICE O/P EST LOW 20 MIN: CPT | Mod: 25 | Performed by: FAMILY MEDICINE

## 2022-10-27 PROCEDURE — 69209 REMOVE IMPACTED EAR WAX UNI: CPT | Mod: RT | Performed by: FAMILY MEDICINE

## 2022-10-27 ASSESSMENT — ENCOUNTER SYMPTOMS
SHORTNESS OF BREATH: 0
COUGH: 0
WHEEZING: 0
FEVER: 0
BLURRED VISION: 0
DOUBLE VISION: 0
CHILLS: 0

## 2022-10-27 NOTE — PROCEDURES
Ear Wax Removal    Date/Time: 10/27/2022 10:01 AM  Performed by: Paul Mullen M.D.  Authorized by: Paul Mullen M.D.     Anesthesia:  Local Anesthetic: none  Ceruminolytics applied: Ceruminolytics applied prior to the procedure.  Location details: right ear and left ear  Patient tolerance: patient tolerated the procedure well with no immediate complications  Procedure type: irrigation

## 2022-10-27 NOTE — PROGRESS NOTES
"Subjective:   Jay Murphy is a 57 y.o. male here today for   Chief Complaint   Patient presents with    Cerumen Impaction       #Cerumen impaction:-Patient having difficulty hearing out of both ears.  Is having some ear discomfort although this is most likely due to seasonal allergies.  Denies any ear pain, ear discharge, dizziness, tinnitus.    #Seasonal allergies:  -Continues to have symptoms of slight sore throat, congestion, runny nose, sinus pressure with allergies.  Does take Allegra occasionally.  Here for further evaluation discussion.      No Known Allergies      Current medicines (including changes today)  Current Outpatient Medications   Medication Sig Dispense Refill    losartan (COZAAR) 50 MG Tab TAKE ONE TABLET BY MOUTH EVERY DAY 90 Tablet 3    triamterene-hctz (MAXZIDE-25/DYAZIDE) 37.5-25 MG Tab Take 1 Tablet by mouth every day. 30 Tablet 8     No current facility-administered medications for this visit.     He  has a past medical history of Hyperlipidemia and Hypertension.    ROS   Review of Systems   Constitutional:  Negative for chills and fever.   Eyes:  Negative for blurred vision and double vision.   Respiratory:  Negative for cough, shortness of breath and wheezing.         Objective:     Physical Exam:  /66   Pulse 76   Temp 36.1 °C (96.9 °F) (Temporal)   Resp 14   Ht 1.778 m (5' 10\")   Wt 69.4 kg (153 lb)   SpO2 97%  Body mass index is 21.95 kg/m².   Constitutional: Alert, no distress.  Skin: Warm, dry, good turgor, no rashes in visible areas.  Eye: Equal, round and reactive, conjunctiva clear, lids normal.  ENMT: Unable to appreciate TMs due to cerumen impaction.  Lips without lesions, good dentition, oropharynx clear.  Respiratory: Unlabored respiratory effort  Psych: Alert and oriented x3, normal affect and mood.    Assessment and Plan:     1. Bilateral impacted cerumen  -Cerumen impaction removal completed, repeat this examination does show some slight TM retraction " secondary to seasonal allergies.  Discussed appropriate way to clean cerumen from ear.  Discussed intranasal corticosteroid, second-generation antihistamine for seasonal allergy treatment.  Follow-up as needed.    2. Seasonal allergies  -See #1.      Followup: No follow-ups on file.         PLEASE NOTE: This dictation was created using voice recognition software. I have made every reasonable attempt to correct obvious errors, but I expect that there are errors of grammar and possibly content that I did not discover before finalizing the note.

## 2022-11-01 DIAGNOSIS — I10 ESSENTIAL HYPERTENSION: ICD-10-CM

## 2022-11-01 NOTE — TELEPHONE ENCOUNTER
Received request via: Patient    Was the patient seen in the last year in this department? Yes  10/27/22  Does the patient have an active prescription (recently filled or refills available) for medication(s) requested? No

## 2022-11-02 RX ORDER — TRIAMTERENE AND HYDROCHLOROTHIAZIDE 37.5; 25 MG/1; MG/1
1 TABLET ORAL
Qty: 30 TABLET | Refills: 8 | Status: SHIPPED | OUTPATIENT
Start: 2022-11-02 | End: 2023-01-16 | Stop reason: SDUPTHER

## 2022-11-16 ENCOUNTER — HOSPITAL ENCOUNTER (OUTPATIENT)
Dept: LAB | Facility: MEDICAL CENTER | Age: 57
End: 2022-11-16
Attending: FAMILY MEDICINE
Payer: COMMERCIAL

## 2022-11-16 DIAGNOSIS — I10 ESSENTIAL HYPERTENSION: ICD-10-CM

## 2022-11-16 DIAGNOSIS — E78.5 DYSLIPIDEMIA: ICD-10-CM

## 2022-11-16 DIAGNOSIS — Z76.89 ENCOUNTER TO ESTABLISH CARE: ICD-10-CM

## 2022-11-16 DIAGNOSIS — Z12.5 ENCOUNTER FOR SCREENING FOR MALIGNANT NEOPLASM OF PROSTATE: ICD-10-CM

## 2022-11-16 LAB
ALBUMIN SERPL BCP-MCNC: 4.8 G/DL (ref 3.2–4.9)
ALBUMIN/GLOB SERPL: 1.7 G/DL
ALP SERPL-CCNC: 63 U/L (ref 30–99)
ALT SERPL-CCNC: 30 U/L (ref 2–50)
ANION GAP SERPL CALC-SCNC: 11 MMOL/L (ref 7–16)
AST SERPL-CCNC: 20 U/L (ref 12–45)
BILIRUB SERPL-MCNC: 1.6 MG/DL (ref 0.1–1.5)
BUN SERPL-MCNC: 18 MG/DL (ref 8–22)
CALCIUM SERPL-MCNC: 10.1 MG/DL (ref 8.5–10.5)
CHLORIDE SERPL-SCNC: 98 MMOL/L (ref 96–112)
CHOLEST SERPL-MCNC: 242 MG/DL (ref 100–199)
CO2 SERPL-SCNC: 26 MMOL/L (ref 20–33)
CREAT SERPL-MCNC: 0.92 MG/DL (ref 0.5–1.4)
ERYTHROCYTE [DISTWIDTH] IN BLOOD BY AUTOMATED COUNT: 38.4 FL (ref 35.9–50)
FASTING STATUS PATIENT QL REPORTED: NORMAL
GFR SERPLBLD CREATININE-BSD FMLA CKD-EPI: 98 ML/MIN/1.73 M 2
GLOBULIN SER CALC-MCNC: 2.9 G/DL (ref 1.9–3.5)
GLUCOSE SERPL-MCNC: 90 MG/DL (ref 65–99)
HCT VFR BLD AUTO: 50.2 % (ref 42–52)
HDLC SERPL-MCNC: 45 MG/DL
HGB BLD-MCNC: 17.1 G/DL (ref 14–18)
LDLC SERPL CALC-MCNC: 173 MG/DL
MCH RBC QN AUTO: 28.9 PG (ref 27–33)
MCHC RBC AUTO-ENTMCNC: 34.1 G/DL (ref 33.7–35.3)
MCV RBC AUTO: 84.9 FL (ref 81.4–97.8)
PLATELET # BLD AUTO: 224 K/UL (ref 164–446)
PMV BLD AUTO: 10.4 FL (ref 9–12.9)
POTASSIUM SERPL-SCNC: 3.8 MMOL/L (ref 3.6–5.5)
PROT SERPL-MCNC: 7.7 G/DL (ref 6–8.2)
PSA SERPL-MCNC: 0.83 NG/ML (ref 0–4)
RBC # BLD AUTO: 5.91 M/UL (ref 4.7–6.1)
SODIUM SERPL-SCNC: 135 MMOL/L (ref 135–145)
TRIGL SERPL-MCNC: 120 MG/DL (ref 0–149)
WBC # BLD AUTO: 5.8 K/UL (ref 4.8–10.8)

## 2022-11-16 PROCEDURE — 85027 COMPLETE CBC AUTOMATED: CPT

## 2022-11-16 PROCEDURE — 36415 COLL VENOUS BLD VENIPUNCTURE: CPT

## 2022-11-16 PROCEDURE — 80061 LIPID PANEL: CPT

## 2022-11-16 PROCEDURE — 84153 ASSAY OF PSA TOTAL: CPT

## 2022-11-16 PROCEDURE — 80053 COMPREHEN METABOLIC PANEL: CPT

## 2022-12-01 ENCOUNTER — APPOINTMENT (OUTPATIENT)
Dept: PHYSICAL THERAPY | Facility: MEDICAL CENTER | Age: 57
End: 2022-12-01
Attending: FAMILY MEDICINE
Payer: COMMERCIAL

## 2022-12-09 ENCOUNTER — APPOINTMENT (OUTPATIENT)
Dept: PHYSICAL THERAPY | Facility: MEDICAL CENTER | Age: 57
End: 2022-12-09
Attending: FAMILY MEDICINE
Payer: COMMERCIAL

## 2022-12-16 ENCOUNTER — APPOINTMENT (OUTPATIENT)
Dept: PHYSICAL THERAPY | Facility: MEDICAL CENTER | Age: 57
End: 2022-12-16
Attending: FAMILY MEDICINE
Payer: COMMERCIAL

## 2022-12-23 ENCOUNTER — APPOINTMENT (OUTPATIENT)
Dept: PHYSICAL THERAPY | Facility: MEDICAL CENTER | Age: 57
End: 2022-12-23
Attending: FAMILY MEDICINE
Payer: COMMERCIAL

## 2022-12-30 ENCOUNTER — APPOINTMENT (OUTPATIENT)
Dept: PHYSICAL THERAPY | Facility: MEDICAL CENTER | Age: 57
End: 2022-12-30
Attending: FAMILY MEDICINE
Payer: COMMERCIAL

## 2023-01-16 ENCOUNTER — PATIENT MESSAGE (OUTPATIENT)
Dept: MEDICAL GROUP | Facility: LAB | Age: 58
End: 2023-01-16
Payer: COMMERCIAL

## 2023-01-16 DIAGNOSIS — I10 ESSENTIAL HYPERTENSION: ICD-10-CM

## 2023-01-16 DIAGNOSIS — E78.5 DYSLIPIDEMIA: ICD-10-CM

## 2023-01-16 RX ORDER — LOSARTAN POTASSIUM 50 MG/1
50 TABLET ORAL
Qty: 90 TABLET | Refills: 3 | Status: SHIPPED | OUTPATIENT
Start: 2023-01-16 | End: 2024-01-22

## 2023-01-16 RX ORDER — ATORVASTATIN CALCIUM 20 MG/1
20 TABLET, FILM COATED ORAL NIGHTLY
Qty: 90 TABLET | Refills: 3 | Status: SHIPPED | OUTPATIENT
Start: 2023-01-16 | End: 2024-01-11

## 2023-01-16 RX ORDER — TRIAMTERENE AND HYDROCHLOROTHIAZIDE 37.5; 25 MG/1; MG/1
1 TABLET ORAL
Qty: 90 TABLET | Refills: 3 | Status: SHIPPED | OUTPATIENT
Start: 2023-01-16 | End: 2024-01-29

## 2023-04-17 ENCOUNTER — PATIENT MESSAGE (OUTPATIENT)
Dept: MEDICAL GROUP | Facility: LAB | Age: 58
End: 2023-04-17
Payer: COMMERCIAL

## 2023-04-17 DIAGNOSIS — T75.3XXA SEA SICKNESS, INITIAL ENCOUNTER: ICD-10-CM

## 2023-04-17 NOTE — PATIENT COMMUNICATION
Received request via: Patient    Was the patient seen in the last year in this department? Yes    Does the patient have an active prescription (recently filled or refills available) for medication(s) requested? No    Does the patient have long term Plus and need 100 day supply (blood pressure, diabetes and cholesterol meds only)? Patient does not have SCP      scopolamine (TRANSDERM-SCOP) 1 MG/3DAYS PATCH 72 HR       PROVIDER:[TOKEN:[[id number]]],PROVIDER:[TOKEN:[[id number]]]

## 2023-04-18 RX ORDER — SCOLOPAMINE TRANSDERMAL SYSTEM 1 MG/1
1 PATCH, EXTENDED RELEASE TRANSDERMAL
Qty: 4 PATCH | Refills: 0 | Status: SHIPPED | OUTPATIENT
Start: 2023-04-18

## 2023-05-16 ENCOUNTER — NON-PROVIDER VISIT (OUTPATIENT)
Dept: MEDICAL GROUP | Facility: LAB | Age: 58
End: 2023-05-16
Payer: COMMERCIAL

## 2023-05-16 DIAGNOSIS — Z23 NEED FOR VACCINATION: ICD-10-CM

## 2023-05-16 PROCEDURE — 90471 IMMUNIZATION ADMIN: CPT | Performed by: NURSE PRACTITIONER

## 2023-05-16 PROCEDURE — 90715 TDAP VACCINE 7 YRS/> IM: CPT | Performed by: NURSE PRACTITIONER

## 2023-05-16 NOTE — PROGRESS NOTES
"Jay Murphy is a 57 y.o. male here for a non-provider visit for:   TDAP    Reason for immunization: Overdue/Provider Recommended  Immunization records indicate need for vaccine: Yes, confirmed with Epic  Minimum interval has been met for this vaccine: Yes  ABN completed: Not Indicated    VIS Dated  8/6/2021 was given to patient: Yes  All IAC Questionnaire questions were answered \"No.\"    Patient tolerated injection and no adverse effects were observed or reported: Yes    Pt scheduled for next dose in series: Not Indicated  "

## 2023-12-11 ENCOUNTER — OFFICE VISIT (OUTPATIENT)
Dept: MEDICAL GROUP | Facility: LAB | Age: 58
End: 2023-12-11
Payer: COMMERCIAL

## 2023-12-11 VITALS
DIASTOLIC BLOOD PRESSURE: 76 MMHG | SYSTOLIC BLOOD PRESSURE: 126 MMHG | BODY MASS INDEX: 22.48 KG/M2 | RESPIRATION RATE: 16 BRPM | WEIGHT: 157 LBS | TEMPERATURE: 97 F | HEIGHT: 70 IN | HEART RATE: 72 BPM | OXYGEN SATURATION: 97 %

## 2023-12-11 DIAGNOSIS — H69.92 EUSTACHIAN TUBE DYSFUNCTION, LEFT: ICD-10-CM

## 2023-12-11 PROCEDURE — 3074F SYST BP LT 130 MM HG: CPT | Performed by: FAMILY MEDICINE

## 2023-12-11 PROCEDURE — 3078F DIAST BP <80 MM HG: CPT | Performed by: FAMILY MEDICINE

## 2023-12-11 PROCEDURE — 99213 OFFICE O/P EST LOW 20 MIN: CPT | Performed by: FAMILY MEDICINE

## 2023-12-11 ASSESSMENT — FIBROSIS 4 INDEX: FIB4 SCORE: 0.91

## 2023-12-11 NOTE — PROGRESS NOTES
"Subjective:   Jay Murphy is a 58 y.o. male here today for   Chief Complaint   Patient presents with    Wax in Ear     Fulness, left ear mainly, always feeling it, noticed within the last month or so.        #Ear fullness   -Symptoms ongoing for the last month. Described as a non-painful fullness in left ear. Symptoms of constantly present.  Patient also feels symptoms of \"drainage\" down his throat on the left side as well.  He denies any recent illnesses.  Did try treating with 1 dose of secondary to histamine, intranasal corticosteroid with no real improvement with that 1 dose.  Denies any fevers, chills, chest pain, shortness of breath.      No Known Allergies      Current medicines (including changes today)  Current Outpatient Medications   Medication Sig Dispense Refill    scopolamine (TRANSDERM-SCOP) 1 mg/72hr PATCH 72 HR Place 1 Patch on the skin every 72 hours. 4 Patch 0    losartan (COZAAR) 50 MG Tab Take 1 Tablet by mouth every day for 360 days. 90 Tablet 3    triamterene-hctz (MAXZIDE-25/DYAZIDE) 37.5-25 MG Tab Take 1 Tablet by mouth every day for 360 days. 90 Tablet 3    atorvastatin (LIPITOR) 20 MG Tab Take 1 Tablet by mouth every evening for 360 days. 90 Tablet 3     No current facility-administered medications for this visit.     He  has a past medical history of Hyperlipidemia and Hypertension.    ROS   -See HPI       Objective:     Physical Exam:  /76   Pulse 72   Temp 36.1 °C (97 °F) (Temporal)   Resp 16   Ht 1.778 m (5' 10\")   Wt 71.2 kg (157 lb)   SpO2 97%  Body mass index is 22.53 kg/m².   Constitutional: Alert, no distress.  Skin: Warm, dry, good turgor, no rashes in visible areas.  Eye: Equal, round and reactive, conjunctiva clear, lids normal.  ENMT: TM's clear bilaterally, lips without lesions, good dentition, oropharynx clear.  Neck: Trachea midline, no masses, no thyromegaly. No cervical or supraclavicular lymphadenopathy.  Respiratory: Unlabored respiratory " effort  Psych: Alert and oriented x3, normal affect and mood.    Assessment and Plan:     1. Eustachian tube dysfunction, left  -Physical examination relatively benign.  Patient's symptoms present today is concerning for potential eustachian tube dysfunction.  Discussed continued use of intranasal corticosteroid for the next 2 weeks, decongestant such as Sudafed as needed for symptomatic relief.  Strict turn precautions given, patient will follow-up as needed.      Followup: No follow-ups on file.         PLEASE NOTE: This dictation was created using voice recognition software. I have made every reasonable attempt to correct obvious errors, but I expect that there are errors of grammar and possibly content that I did not discover before finalizing the note.

## 2024-01-21 DIAGNOSIS — I10 ESSENTIAL HYPERTENSION: ICD-10-CM

## 2024-01-22 RX ORDER — LOSARTAN POTASSIUM 50 MG/1
50 TABLET ORAL
Qty: 90 TABLET | Refills: 3 | Status: SHIPPED | OUTPATIENT
Start: 2024-01-22

## 2024-01-22 NOTE — TELEPHONE ENCOUNTER
Received request via: Pharmacy    Was the patient seen in the last year in this department? Yes    Does the patient have an active prescription (recently filled or refills available) for medication(s) requested? No    [facility]     Does the patient have [facility] Plus and need 100 day supply (blood pressure, diabetes and cholesterol meds only)? Patient does not have SCP    LOSARTAN POTASSIUM 50MG TABS

## 2024-01-27 DIAGNOSIS — I10 ESSENTIAL HYPERTENSION: ICD-10-CM

## 2024-01-29 RX ORDER — TRIAMTERENE AND HYDROCHLOROTHIAZIDE 37.5; 25 MG/1; MG/1
1 TABLET ORAL
Qty: 90 TABLET | Refills: 3 | Status: SHIPPED | OUTPATIENT
Start: 2024-01-29 | End: 2025-01-23

## 2024-01-29 NOTE — TELEPHONE ENCOUNTER
Received request via: Pharmacy    Was the patient seen in the last year in this department? Yes    Does the patient have an active prescription (recently filled or refills available) for medication(s) requested? No    Pharmacy Name: [facility]     Does the patient have half-way Plus and need 100 day supply (blood pressure, diabetes and cholesterol meds only)? Patient does not have SCP    TRIAMTERENE-HCTZ 37.5-25MG TABS

## 2024-06-17 SDOH — ECONOMIC STABILITY: HOUSING INSECURITY
IN THE LAST 12 MONTHS, WAS THERE A TIME WHEN YOU DID NOT HAVE A STEADY PLACE TO SLEEP OR SLEPT IN A SHELTER (INCLUDING NOW)?: PATIENT DECLINED

## 2024-06-17 SDOH — ECONOMIC STABILITY: FOOD INSECURITY: WITHIN THE PAST 12 MONTHS, THE FOOD YOU BOUGHT JUST DIDN'T LAST AND YOU DIDN'T HAVE MONEY TO GET MORE.: PATIENT DECLINED

## 2024-06-17 SDOH — HEALTH STABILITY: PHYSICAL HEALTH: ON AVERAGE, HOW MANY MINUTES DO YOU ENGAGE IN EXERCISE AT THIS LEVEL?: PATIENT DECLINED

## 2024-06-17 SDOH — ECONOMIC STABILITY: HOUSING INSECURITY

## 2024-06-17 SDOH — HEALTH STABILITY: PHYSICAL HEALTH
ON AVERAGE, HOW MANY DAYS PER WEEK DO YOU ENGAGE IN MODERATE TO STRENUOUS EXERCISE (LIKE A BRISK WALK)?: PATIENT DECLINED

## 2024-06-17 SDOH — HEALTH STABILITY: MENTAL HEALTH
STRESS IS WHEN SOMEONE FEELS TENSE, NERVOUS, ANXIOUS, OR CAN'T SLEEP AT NIGHT BECAUSE THEIR MIND IS TROUBLED. HOW STRESSED ARE YOU?: PATIENT DECLINED

## 2024-06-17 SDOH — ECONOMIC STABILITY: INCOME INSECURITY: HOW HARD IS IT FOR YOU TO PAY FOR THE VERY BASICS LIKE FOOD, HOUSING, MEDICAL CARE, AND HEATING?: PATIENT DECLINED

## 2024-06-17 SDOH — ECONOMIC STABILITY: INCOME INSECURITY: IN THE LAST 12 MONTHS, WAS THERE A TIME WHEN YOU WERE NOT ABLE TO PAY THE MORTGAGE OR RENT ON TIME?: PATIENT DECLINED

## 2024-06-17 SDOH — ECONOMIC STABILITY: FOOD INSECURITY: WITHIN THE PAST 12 MONTHS, YOU WORRIED THAT YOUR FOOD WOULD RUN OUT BEFORE YOU GOT MONEY TO BUY MORE.: PATIENT DECLINED

## 2024-06-17 ASSESSMENT — SOCIAL DETERMINANTS OF HEALTH (SDOH)
ARE YOU MARRIED, WIDOWED, DIVORCED, SEPARATED, NEVER MARRIED, OR LIVING WITH A PARTNER?: NEVER MARRIED
IN THE PAST 12 MONTHS, HAS THE ELECTRIC, GAS, OIL, OR WATER COMPANY THREATENED TO SHUT OFF SERVICE IN YOUR HOME?: PATIENT DECLINED
HOW OFTEN DO YOU ATTEND CHURCH OR RELIGIOUS SERVICES?: PATIENT DECLINED
HOW OFTEN DO YOU HAVE A DRINK CONTAINING ALCOHOL: MONTHLY OR LESS
HOW OFTEN DO YOU ATTENT MEETINGS OF THE CLUB OR ORGANIZATION YOU BELONG TO?: PATIENT DECLINED
HOW OFTEN DO YOU ATTEND CHURCH OR RELIGIOUS SERVICES?: PATIENT DECLINED
DO YOU BELONG TO ANY CLUBS OR ORGANIZATIONS SUCH AS CHURCH GROUPS UNIONS, FRATERNAL OR ATHLETIC GROUPS, OR SCHOOL GROUPS?: PATIENT DECLINED
HOW OFTEN DO YOU GET TOGETHER WITH FRIENDS OR RELATIVES?: PATIENT DECLINED
HOW MANY DRINKS CONTAINING ALCOHOL DO YOU HAVE ON A TYPICAL DAY WHEN YOU ARE DRINKING: PATIENT DECLINED
DO YOU BELONG TO ANY CLUBS OR ORGANIZATIONS SUCH AS CHURCH GROUPS UNIONS, FRATERNAL OR ATHLETIC GROUPS, OR SCHOOL GROUPS?: PATIENT DECLINED
HOW HARD IS IT FOR YOU TO PAY FOR THE VERY BASICS LIKE FOOD, HOUSING, MEDICAL CARE, AND HEATING?: PATIENT DECLINED
HOW OFTEN DO YOU GET TOGETHER WITH FRIENDS OR RELATIVES?: PATIENT DECLINED
IN A TYPICAL WEEK, HOW MANY TIMES DO YOU TALK ON THE PHONE WITH FAMILY, FRIENDS, OR NEIGHBORS?: PATIENT DECLINED
ARE YOU MARRIED, WIDOWED, DIVORCED, SEPARATED, NEVER MARRIED, OR LIVING WITH A PARTNER?: NEVER MARRIED
HOW OFTEN DO YOU HAVE SIX OR MORE DRINKS ON ONE OCCASION: NEVER
HOW OFTEN DO YOU ATTENT MEETINGS OF THE CLUB OR ORGANIZATION YOU BELONG TO?: PATIENT DECLINED
WITHIN THE PAST 12 MONTHS, YOU WORRIED THAT YOUR FOOD WOULD RUN OUT BEFORE YOU GOT THE MONEY TO BUY MORE: PATIENT DECLINED
IN A TYPICAL WEEK, HOW MANY TIMES DO YOU TALK ON THE PHONE WITH FAMILY, FRIENDS, OR NEIGHBORS?: PATIENT DECLINED

## 2024-06-17 ASSESSMENT — LIFESTYLE VARIABLES
HOW OFTEN DO YOU HAVE SIX OR MORE DRINKS ON ONE OCCASION: NEVER
HOW OFTEN DO YOU HAVE A DRINK CONTAINING ALCOHOL: MONTHLY OR LESS
SKIP TO QUESTIONS 9-10: 0
HOW MANY STANDARD DRINKS CONTAINING ALCOHOL DO YOU HAVE ON A TYPICAL DAY: PATIENT DECLINED
AUDIT-C TOTAL SCORE: -1

## 2024-06-20 ENCOUNTER — APPOINTMENT (OUTPATIENT)
Dept: MEDICAL GROUP | Facility: LAB | Age: 59
End: 2024-06-20
Payer: COMMERCIAL

## 2024-06-20 VITALS
HEIGHT: 70 IN | DIASTOLIC BLOOD PRESSURE: 66 MMHG | HEART RATE: 80 BPM | SYSTOLIC BLOOD PRESSURE: 110 MMHG | TEMPERATURE: 97.4 F | OXYGEN SATURATION: 96 % | RESPIRATION RATE: 18 BRPM | BODY MASS INDEX: 21.76 KG/M2 | WEIGHT: 152 LBS

## 2024-06-20 DIAGNOSIS — Z91.89 AT RISK FOR SLEEP APNEA: ICD-10-CM

## 2024-06-20 DIAGNOSIS — I10 ESSENTIAL HYPERTENSION: ICD-10-CM

## 2024-06-20 DIAGNOSIS — E78.5 DYSLIPIDEMIA: ICD-10-CM

## 2024-06-20 DIAGNOSIS — Z11.4 SCREENING FOR HIV (HUMAN IMMUNODEFICIENCY VIRUS): ICD-10-CM

## 2024-06-20 DIAGNOSIS — Z00.00 ANNUAL PHYSICAL EXAM: ICD-10-CM

## 2024-06-20 DIAGNOSIS — Z12.5 ENCOUNTER FOR SCREENING FOR MALIGNANT NEOPLASM OF PROSTATE: ICD-10-CM

## 2024-06-20 DIAGNOSIS — Z12.83 SKIN CANCER SCREENING: ICD-10-CM

## 2024-06-20 DIAGNOSIS — Z11.59 NEED FOR HEPATITIS C SCREENING TEST: ICD-10-CM

## 2024-06-20 PROBLEM — Z71.84 TRAVEL ADVICE ENCOUNTER: Status: RESOLVED | Noted: 2019-08-27 | Resolved: 2024-06-20

## 2024-06-20 PROBLEM — R14.0 ABDOMINAL BLOATING: Status: RESOLVED | Noted: 2019-08-27 | Resolved: 2024-06-20

## 2024-06-20 PROBLEM — R19.4 BOWEL HABIT CHANGES: Status: RESOLVED | Noted: 2019-08-27 | Resolved: 2024-06-20

## 2024-06-20 PROCEDURE — 99396 PREV VISIT EST AGE 40-64: CPT | Performed by: FAMILY MEDICINE

## 2024-06-20 PROCEDURE — 99214 OFFICE O/P EST MOD 30 MIN: CPT | Mod: 25 | Performed by: FAMILY MEDICINE

## 2024-06-20 RX ORDER — ATORVASTATIN CALCIUM 20 MG/1
20 TABLET, FILM COATED ORAL NIGHTLY
Qty: 90 TABLET | Refills: 3 | Status: SHIPPED | OUTPATIENT
Start: 2024-06-20 | End: 2025-06-15

## 2024-06-20 ASSESSMENT — PATIENT HEALTH QUESTIONNAIRE - PHQ9: CLINICAL INTERPRETATION OF PHQ2 SCORE: 0

## 2024-06-20 ASSESSMENT — FIBROSIS 4 INDEX: FIB4 SCORE: 0.91

## 2024-06-20 NOTE — PROGRESS NOTES
Verbal consent was acquired by the patient to use [employer] ambient listening note generation during this visit Yes    Subjective:   Jay Murphy is a 58 y.o. male here today for   No chief complaint on file.    History of Present Illness  The patient is a 58-year-old male here today for annual physical exam. The patient has history of hypertension, currently on losartan and triamterene, hydrochlorothiazide combo pill. The patient is also here to discuss cardiovascular health and risk.    Patient has history of hyperlipidemia with an elevated CT calcium score with additional in 2017.  Patient was prescribed statin; however, was only partially compliant with medication.  Once it was completed he did not refill it.  He does have significant family history of CAD and several members of his first-degree family.  Patient is working on appropriate diet and exercise regiment denies any symptoms at this time but is concerned regarding his family history and elevated CT calcium score would like to discuss restarting statin.    He engages in weekly running activities without experiencing chest pain or shortness of breath. He reports no new sexual partners or concerns for STD screening. He maintains regular dental check-ups every 6 months. He denies experiencing abdominal pain, nausea, diarrhea, or urinary issues. He does, however, experience occasional constipation. A colonoscopy conducted a year ago yielded normal results. He denies depression or anxiety, but reports situational work-related anxiety.    The patient has been adhering to a combination of losartan and triamterene-hydrochlorothiazide since 2012 or 2013. He reports experiencing lightheadedness post-run, which resolves upon cooling off, lasting between 3 to 5 minutes. He is uncertain if this lightheadedness is related to his water balance, blood pressure, or low blood pressure. His blood pressure typically measures around 130/65, but he has not been  monitoring his blood pressure at home recently.    The patient reports snoring and restlessness during sleep. His partner has recommended a sleep study due to persistent fatigue. He denies sleep apnea, but reports waking up coughing and choking. He suspects a narrow airway and suspects an unusual sensation in the back of his throat during running. He denies chest pain, shortness of breath, abdominal pain, nausea, diarrhea, or urinary issues.    The patient suffers from allergies, for which he takes Allegra and Flonase as needed. He took Flonase this morning after running last night, which exacerbated his symptoms the following morning. He frequently clears his throat due to a tickling sensation, which has been ongoing for approximately a year.    The patient has recently developed spots on his forehead. He does not currently have a dermatologist. He spends significant time in the sun, such as hiking, and frequently wears a hat, which occasionally results in dizziness.   He drinks 1 to 2 glasses of wine a month. He denies smoking or drug use.   He has a family history of heart disease and hypertension. His father had open heart surgery at the age of 65. His paternal grandmother and aunt had open heart surgery. His paternal grandfather  of a massive heart attack.      Allergies   Allergen Reactions    Atropine Unspecified     white lips, swollen lips and swollen mouth, 5 y.o had a reaction          Current medicines (including changes today)  Current Outpatient Medications   Medication Sig Dispense Refill    triamterene-hctz (MAXZIDE-25/DYAZIDE) 37.5-25 MG Tab Take 1 Tablet by mouth every day for 360 days. 90 Tablet 3    losartan (COZAAR) 50 MG Tab TAKE ONE TABLET BY MOUTH EVERY DAY 90 Tablet 3    scopolamine (TRANSDERM-SCOP) 1 mg/72hr PATCH 72 HR Place 1 Patch on the skin every 72 hours. 4 Patch 0     No current facility-administered medications for this visit.     He  has a past medical history of Hyperlipidemia  "and Hypertension.    ROS   ROS  -See HPI     Objective:     Physical Exam:  /66   Pulse 80   Temp 36.3 °C (97.4 °F) (Temporal)   Resp 18   Ht 1.778 m (5' 10\")   Wt 68.9 kg (152 lb)   SpO2 96%  Body mass index is 21.81 kg/m².   Constitutional: Alert, no distress.  Skin: Warm, dry, good turgor, no rashes in visible areas.  Eye: Equal, round and reactive, conjunctiva clear, lids normal.  ENMT: TM's clear bilaterally, lips without lesions, good dentition, oropharynx clear.  Neck: Trachea midline, no masses, no thyromegaly. No cervical or supraclavicular lymphadenopathy.  Respiratory: Unlabored respiratory effort, lungs clear to auscultation, no wheezes, no rhonchi.  Cardiovascular: Normal S1, S2, no murmur, no edema.  Abdomen: Soft, non-tender, no masses, no hepatosplenomegaly.  Psych: Alert and oriented x3, normal affect and mood.    Results  Imaging  CT calcium score done in 2017 was high for the patient's age.    STOPBANG - Sleep Apnea Screening      Flowsheet Row Most Recent Value   S - Have you been told that you SNORE? Yes   T - Are you often TIRED during the day? Yes   O - Do you know if you stop breathing or has anyone witnessed you stop breathing while you were asleep? (OBSTRUCTION) No   P - Do you have high blood PRESSURE or on medication to control high blood pressure? Yes   B - Is your Body Mass Index greater than 35? (BMI) No   A - Are you 50 years old or older? (AGE) Yes   N - Are you a male with a NECK circumference greater than 17 inches, or a female with a neck circumference greater than 16 inches? No   G - Are you male? (GENDER) Yes   STOPBANG Total Score 5   ANANT Risk High Risk            Assessment and Plan:     Assessment & Plan  1. Annual physical exam.  -All questions concerns were answered at this time.  -Vaccinations/screenings needed at this time: Discussed completion of shingles vaccine.  Patient has not yet had chickenpox in his life.  Completed colonoscopy.  Labs ordered below " including PSA, hepatitis C, HIV.  -Labs reviewed, concerns, check labs again as below.  -Discussed the importance of a healthy, Mediterranean style diet, routine exercise regimen.  -Discussed general safety measures including seatbelts, helmets, avoidance of smoking, sunscreen, hydration.  -Follow-up for general physical exam on a yearly basis, sooner if needed.    2.  Hyperlipidemia  Patient has longstanding history of elevated cholesterol with an ASCVD risk score of  The 10-year ASCVD risk score (Buffy ARAUJO, et al., 2019) is: 7.4%  While ASCVD risk was below 10, he does have a significantly elevated CT calcium score.  Because of this as well as family history I do recommend we restart statin.  Will call in medication.  Patient will start take medication daily, can do the proper diet and exercise regimen.  Will follow-up in 6 months for repeat lipid profile and titration of medication if needed.    3. Hypertension.  The patient's lightheadedness is likely a combination of both losartan and triamterene-hydrochlorothiazide. His blood pressure readings have been consistently low, with a reading of 104/66 during his last visit. The patient was advised to monitor his blood pressure post-run and maintain a log. If necessary, adjustments will be made to his medication dosage. He was advised to bring his blood pressure cuff to his next visit for calibration.    4.  At risk for sleep apnea.  New problem, uncertain prognosis or etiology.  STOP-BANG score of 5.  Patient meets criteria for further evaluation.  For a male over 50 with a history of high blood pressure, snoring, and daytime fatigue. A home sleep study will be ordered.    5. Chronic allergy symptoms and snoring.  The patient was advised to continue using Flonase regularly.    6. Spots on the forehead.  The spots are age-related. A referral to a dermatologist will be made for routine skin checks. The patient was advised to use sunscreen while  running.    Follow-up  The patient is scheduled for a follow-up visit in 6 months.    Orders:  1. Annual physical exam  - CBC WITHOUT DIFFERENTIAL; Future  - Comp Metabolic Panel; Future    2. At risk for sleep apnea  - Overnight Home Sleep Study; Future    3. Essential hypertension    4. Dyslipidemia  - atorvastatin (LIPITOR) 20 MG Tab; Take 1 Tablet by mouth every evening for 360 days.  Dispense: 90 Tablet; Refill: 3  - Lipid Profile; Future  - CT-CARDIAC SCORING; Future    5. Skin cancer screening  - Referral to Dermatology    6. Encounter for screening for malignant neoplasm of prostate  - PROSTATE SPECIFIC AG SCREENING; Future    7. Need for hepatitis C screening test  - HEP C VIRUS ANTIBODY; Future    8. Screening for HIV (human immunodeficiency virus)  - HIV AG/AB COMBO ASSAY SCREENING; Future        Followup: No follow-ups on file.         PLEASE NOTE: This dictation was created using voice recognition and Powered ambient listening software. I have made every reasonable attempt to correct obvious errors, but I expect that there are errors of grammar and possibly content that I did not discover before finalizing the note.

## 2024-07-02 ENCOUNTER — APPOINTMENT (OUTPATIENT)
Dept: LAB | Facility: MEDICAL CENTER | Age: 59
End: 2024-07-02
Payer: COMMERCIAL

## 2024-07-02 DIAGNOSIS — Z11.4 SCREENING FOR HIV (HUMAN IMMUNODEFICIENCY VIRUS): ICD-10-CM

## 2024-07-02 DIAGNOSIS — Z11.59 NEED FOR HEPATITIS C SCREENING TEST: ICD-10-CM

## 2024-07-02 DIAGNOSIS — E78.5 DYSLIPIDEMIA: ICD-10-CM

## 2024-07-02 DIAGNOSIS — Z12.5 ENCOUNTER FOR SCREENING FOR MALIGNANT NEOPLASM OF PROSTATE: ICD-10-CM

## 2024-07-02 DIAGNOSIS — Z00.00 ANNUAL PHYSICAL EXAM: ICD-10-CM

## 2024-07-02 LAB
ALBUMIN SERPL BCP-MCNC: 4.6 G/DL (ref 3.2–4.9)
ALBUMIN/GLOB SERPL: 1.5 G/DL
ALP SERPL-CCNC: 62 U/L (ref 30–99)
ALT SERPL-CCNC: 29 U/L (ref 2–50)
ANION GAP SERPL CALC-SCNC: 11 MMOL/L (ref 7–16)
AST SERPL-CCNC: 26 U/L (ref 12–45)
BILIRUB SERPL-MCNC: 0.6 MG/DL (ref 0.1–1.5)
BUN SERPL-MCNC: 17 MG/DL (ref 8–22)
CALCIUM ALBUM COR SERPL-MCNC: 9.4 MG/DL (ref 8.5–10.5)
CALCIUM SERPL-MCNC: 9.9 MG/DL (ref 8.5–10.5)
CHLORIDE SERPL-SCNC: 102 MMOL/L (ref 96–112)
CHOLEST SERPL-MCNC: 236 MG/DL (ref 100–199)
CO2 SERPL-SCNC: 25 MMOL/L (ref 20–33)
CREAT SERPL-MCNC: 1.03 MG/DL (ref 0.5–1.4)
ERYTHROCYTE [DISTWIDTH] IN BLOOD BY AUTOMATED COUNT: 40.4 FL (ref 35.9–50)
GFR SERPLBLD CREATININE-BSD FMLA CKD-EPI: 85 ML/MIN/1.73 M 2
GLOBULIN SER CALC-MCNC: 3 G/DL (ref 1.9–3.5)
GLUCOSE SERPL-MCNC: 86 MG/DL (ref 65–99)
HCT VFR BLD AUTO: 49.9 % (ref 42–52)
HCV AB SER QL: NORMAL
HDLC SERPL-MCNC: 45 MG/DL
HGB BLD-MCNC: 16.5 G/DL (ref 14–18)
HIV 1+2 AB+HIV1 P24 AG SERPL QL IA: NORMAL
LDLC SERPL CALC-MCNC: 177 MG/DL
MCH RBC QN AUTO: 28.5 PG (ref 27–33)
MCHC RBC AUTO-ENTMCNC: 33.1 G/DL (ref 32.3–36.5)
MCV RBC AUTO: 86.3 FL (ref 81.4–97.8)
PLATELET # BLD AUTO: 240 K/UL (ref 164–446)
PMV BLD AUTO: 10.4 FL (ref 9–12.9)
POTASSIUM SERPL-SCNC: 4.8 MMOL/L (ref 3.6–5.5)
PROT SERPL-MCNC: 7.6 G/DL (ref 6–8.2)
PSA SERPL-MCNC: 0.86 NG/ML (ref 0–4)
RBC # BLD AUTO: 5.78 M/UL (ref 4.7–6.1)
SODIUM SERPL-SCNC: 138 MMOL/L (ref 135–145)
TRIGL SERPL-MCNC: 71 MG/DL (ref 0–149)
WBC # BLD AUTO: 5 K/UL (ref 4.8–10.8)

## 2024-07-02 PROCEDURE — 80061 LIPID PANEL: CPT

## 2024-07-02 PROCEDURE — 80053 COMPREHEN METABOLIC PANEL: CPT

## 2024-07-02 PROCEDURE — 36415 COLL VENOUS BLD VENIPUNCTURE: CPT

## 2024-07-02 PROCEDURE — 85027 COMPLETE CBC AUTOMATED: CPT

## 2024-07-02 PROCEDURE — 86803 HEPATITIS C AB TEST: CPT

## 2024-07-02 PROCEDURE — 87389 HIV-1 AG W/HIV-1&-2 AB AG IA: CPT

## 2024-07-02 PROCEDURE — 84153 ASSAY OF PSA TOTAL: CPT

## 2024-07-05 ENCOUNTER — HOSPITAL ENCOUNTER (OUTPATIENT)
Dept: RADIOLOGY | Facility: MEDICAL CENTER | Age: 59
End: 2024-07-05
Attending: FAMILY MEDICINE
Payer: COMMERCIAL

## 2024-07-05 ENCOUNTER — TELEPHONE (OUTPATIENT)
Dept: HEALTH INFORMATION MANAGEMENT | Facility: OTHER | Age: 59
End: 2024-07-05

## 2024-07-05 DIAGNOSIS — E78.5 DYSLIPIDEMIA: ICD-10-CM

## 2024-07-05 PROCEDURE — 4410556 CT-CARDIAC SCORING (SELF PAY ONLY)

## 2024-07-10 ENCOUNTER — OFFICE VISIT (OUTPATIENT)
Dept: MEDICAL GROUP | Facility: LAB | Age: 59
End: 2024-07-10
Payer: COMMERCIAL

## 2024-07-10 ENCOUNTER — TELEPHONE (OUTPATIENT)
Dept: HEALTH INFORMATION MANAGEMENT | Facility: OTHER | Age: 59
End: 2024-07-10

## 2024-07-10 VITALS
HEART RATE: 78 BPM | HEIGHT: 70 IN | BODY MASS INDEX: 21.62 KG/M2 | OXYGEN SATURATION: 97 % | DIASTOLIC BLOOD PRESSURE: 68 MMHG | RESPIRATION RATE: 18 BRPM | TEMPERATURE: 97.5 F | WEIGHT: 151 LBS | SYSTOLIC BLOOD PRESSURE: 106 MMHG

## 2024-07-10 DIAGNOSIS — E78.5 DYSLIPIDEMIA: ICD-10-CM

## 2024-07-10 DIAGNOSIS — R93.1 AGATSTON CAC SCORE, >400: ICD-10-CM

## 2024-07-10 PROCEDURE — 3078F DIAST BP <80 MM HG: CPT | Performed by: FAMILY MEDICINE

## 2024-07-10 PROCEDURE — 99214 OFFICE O/P EST MOD 30 MIN: CPT | Performed by: FAMILY MEDICINE

## 2024-07-10 PROCEDURE — 3074F SYST BP LT 130 MM HG: CPT | Performed by: FAMILY MEDICINE

## 2024-07-10 RX ORDER — ASPIRIN 81 MG/1
81 TABLET ORAL DAILY
Qty: 100 TABLET | Refills: 3
Start: 2024-07-10

## 2024-07-10 RX ORDER — ATORVASTATIN CALCIUM 40 MG/1
40 TABLET, FILM COATED ORAL NIGHTLY
Qty: 90 TABLET | Refills: 3 | Status: SHIPPED | OUTPATIENT
Start: 2024-07-10 | End: 2025-07-05

## 2024-07-10 ASSESSMENT — FIBROSIS 4 INDEX: FIB4 SCORE: 1.13

## 2024-07-12 ENCOUNTER — HOME STUDY (OUTPATIENT)
Dept: SLEEP MEDICINE | Facility: MEDICAL CENTER | Age: 59
End: 2024-07-12
Attending: FAMILY MEDICINE
Payer: COMMERCIAL

## 2024-07-12 DIAGNOSIS — R93.1 AGATSTON CAC SCORE, >400: ICD-10-CM

## 2024-07-12 DIAGNOSIS — Z91.89 AT RISK FOR SLEEP APNEA: ICD-10-CM

## 2024-07-12 PROCEDURE — 95800 SLP STDY UNATTENDED: CPT | Performed by: STUDENT IN AN ORGANIZED HEALTH CARE EDUCATION/TRAINING PROGRAM

## 2024-08-12 ENCOUNTER — HOSPITAL ENCOUNTER (OUTPATIENT)
Dept: RADIOLOGY | Facility: MEDICAL CENTER | Age: 59
End: 2024-08-12
Attending: FAMILY MEDICINE
Payer: COMMERCIAL

## 2024-08-12 DIAGNOSIS — R93.1 AGATSTON CAC SCORE, >400: ICD-10-CM

## 2024-08-12 PROCEDURE — 93018 CV STRESS TEST I&R ONLY: CPT | Performed by: STUDENT IN AN ORGANIZED HEALTH CARE EDUCATION/TRAINING PROGRAM

## 2024-08-12 PROCEDURE — 93017 CV STRESS TEST TRACING ONLY: CPT

## 2024-08-16 ENCOUNTER — OFFICE VISIT (OUTPATIENT)
Dept: MEDICAL GROUP | Facility: LAB | Age: 59
End: 2024-08-16
Payer: COMMERCIAL

## 2024-08-16 VITALS
SYSTOLIC BLOOD PRESSURE: 112 MMHG | BODY MASS INDEX: 21.05 KG/M2 | DIASTOLIC BLOOD PRESSURE: 68 MMHG | WEIGHT: 147 LBS | RESPIRATION RATE: 16 BRPM | TEMPERATURE: 97.1 F | HEART RATE: 70 BPM | HEIGHT: 70 IN | OXYGEN SATURATION: 99 %

## 2024-08-16 DIAGNOSIS — R94.39 POSITIVE CARDIAC STRESS TEST: ICD-10-CM

## 2024-08-16 DIAGNOSIS — R93.1 AGATSTON CAC SCORE, >400: ICD-10-CM

## 2024-08-16 DIAGNOSIS — G47.30 SLEEP APNEA, UNSPECIFIED TYPE: ICD-10-CM

## 2024-08-16 PROCEDURE — 3078F DIAST BP <80 MM HG: CPT | Performed by: FAMILY MEDICINE

## 2024-08-16 PROCEDURE — 99214 OFFICE O/P EST MOD 30 MIN: CPT | Performed by: FAMILY MEDICINE

## 2024-08-16 PROCEDURE — 3074F SYST BP LT 130 MM HG: CPT | Performed by: FAMILY MEDICINE

## 2024-08-16 ASSESSMENT — FIBROSIS 4 INDEX: FIB4 SCORE: 1.13

## 2024-08-16 NOTE — PROGRESS NOTES
Verbal consent was acquired by the patient to use [employer] ambient listening note generation during this visit Yes    Subjective:   Jay Murphy is a 58 y.o. male here today for   Chief Complaint   Patient presents with    Results     History of Present Illness  The patient is a 58-year-old male who presents today for a follow-up on recent blood results indicating an elevated lipid profile with a total cholesterol of 236 and LDL of 177. He is also here to discuss the results of a recent cardiac stress test conducted on 08/12/2024 and an overnight sleep study completed on 07/12/2024.    He reports feeling well overall, with no specific health concerns.    Regarding his cardiac stress test, it was inconclusive due to high blood pressure, which he found surprising as his blood pressure has not been high in the past. He has been monitoring his blood pressure, which typically ranges around 125/60 to 71, with the highest reading in the past month being 136/89. He has noticed that his blood pressure drops into the upper 90s after running. For example, it was 99/66 with a pulse rate of 121 five minutes after a three-mile run, and then decreased to 102/69, 99/67, 87/67, 94/67 with a decreasing pulse rate from 121 to 114. After waking up and showering, his blood pressure was 118/83 with a pulse rate of 174. He is curious about the perfusion study and whether there are any tests to check if his liver is functioning properly.    He has reviewed the results of his sleep study, which showed an AHI of around 15, indicating mild to moderate sleep apnea. He is interested in trying CPAP therapy as he often feels extremely tired upon waking and believes that improving his sleep quality could help manage his risk factors. He also mentions that he tends to toss and turn a lot during sleep.      Allergies   Allergen Reactions    Atropine Unspecified     white lips, swollen lips and swollen mouth, 5 y.o had a reaction   "        Current medicines (including changes today)  Current Outpatient Medications   Medication Sig Dispense Refill    atorvastatin (LIPITOR) 40 MG Tab Take 1 Tablet by mouth every evening for 360 days. 90 Tablet 3    aspirin 81 MG EC tablet Take 1 Tablet by mouth every day. 100 Tablet 3    triamterene-hctz (MAXZIDE-25/DYAZIDE) 37.5-25 MG Tab Take 1 Tablet by mouth every day for 360 days. 90 Tablet 3    losartan (COZAAR) 50 MG Tab TAKE ONE TABLET BY MOUTH EVERY DAY 90 Tablet 3     No current facility-administered medications for this visit.     He  has a past medical history of Hyperlipidemia and Hypertension.    ROS   ROS  -See HPI     Objective:     Physical Exam:  /68   Pulse 70   Temp 36.2 °C (97.1 °F) (Temporal)   Resp 16   Ht 1.778 m (5' 10\")   Wt 66.7 kg (147 lb)   SpO2 99%  Body mass index is 21.09 kg/m².   Constitutional: Alert, no distress, well-groomed.  Skin: No rashes in visible areas.  Eye: Round. Conjunctiva clear, lids normal. No icterus.   ENMT: Lips pink without lesions, good dentition, moist mucous membranes. Phonation normal.  Neck: No masses, no thyromegaly. Moves freely without pain.  Respiratory: Unlabored respiratory effort, no cough or audible wheeze  Psych: Alert and oriented x3, normal affect and mood.     Results  Laboratory Studies  Total cholesterol 236, .    Testing  Cardiac stress test showed possible ST depressions. Sleep study showed an AHI of 15.    Assessment and Plan:     Assessment & Plan  1. Positive cardiac stress test.  The treadmill cardiac stress test showed concerns for possible ST depressions, which may be secondary to a hypertensive response. Given this finding and a previous CT calcium score of 1500, further evaluation is needed. A nuclear perfusion study will be ordered to assess blood perfusion in the heart muscle. If the perfusion study shows abnormal results, a referral to a cardiologist will be made. He will continue with atorvastatin 40 mg " daily and maintain his current diet and exercise regimen.    2. Sleep apnea.  The recent sleep study results indicate an AHI of 15, suggesting mild to moderate sleep apnea. Treatment with CPAP will be initiated. A referral will be sent to a CPAP provider, who will contact him to set up the machine. He will follow up as needed to assess the effectiveness of the CPAP therapy.      Orders:  1. Agatston CAC score, >400  - NM-CARDIAC STRESS TEST; Future    2. Positive cardiac stress test  - NM-CARDIAC STRESS TEST; Future    3. Sleep apnea, unspecified type  - DME CPAP        Followup: No follow-ups on file.         PLEASE NOTE: This dictation was created using voice recognition and [employer] ambient listening software. I have made every reasonable attempt to correct obvious errors, but I expect that there are errors of grammar and possibly content that I did not discover before finalizing the note.

## 2024-11-01 ENCOUNTER — APPOINTMENT (OUTPATIENT)
Dept: RADIOLOGY | Facility: MEDICAL CENTER | Age: 59
End: 2024-11-01
Attending: FAMILY MEDICINE
Payer: COMMERCIAL

## 2024-11-01 DIAGNOSIS — R93.1 AGATSTON CAC SCORE, >400: ICD-10-CM

## 2024-11-01 DIAGNOSIS — R94.39 POSITIVE CARDIAC STRESS TEST: ICD-10-CM

## 2024-11-01 PROCEDURE — 93018 CV STRESS TEST I&R ONLY: CPT | Performed by: INTERNAL MEDICINE

## 2024-11-01 PROCEDURE — A9502 TC99M TETROFOSMIN: HCPCS

## 2024-11-01 PROCEDURE — 78452 HT MUSCLE IMAGE SPECT MULT: CPT | Mod: 26 | Performed by: INTERNAL MEDICINE

## 2024-11-25 NOTE — PROGRESS NOTES
CC: Jay Murphy is a 51 y.o. male is suffering from   Chief Complaint   Patient presents with   • GI Problem     x a few months          SUBJECTIVE:  1. Diarrhea, unspecified type  Patient's here for follow-up, states he's had problems with intermittent diarrhea after eating chicken which may not been adequately prepared approximately 6 months ago.     2. Weight loss  Secondary to possible malabsorption    3. Screening for colon cancer  Patient will turn 52 in 2 weeks referral written for screening for colon cancer    4. Screening cholesterol level  Patient needed screening cholesterol orders written    5. Screening for prostate cancer  Orders written        Past social, family, history: Single  Social History   Substance Use Topics   • Smoking status: Never Smoker   • Smokeless tobacco: Never Used   • Alcohol use No         MEDICATIONS:    Current Outpatient Prescriptions:   •  losartan (COZAAR) 50 MG Tab, TAKE 1 TABLET BY MOUTH EVERY DAY., Disp: 30 Tab, Rfl: 11  •  triamterene-hctz (MAXZIDE-25/DYAZIDE) 37.5-25 MG Tab, TAKE 1 TABLET BY MOUTH EVERY DAY., Disp: 30 Tab, Rfl: 11  •  fluticasone (FLONASE) 50 MCG/ACT nasal spray, Spray 1 Spray in nose every day. Each Nostril, Disp: 16 g, Rfl: 11  •  azelastine (ASTELIN) 137 MCG/SPRAY nasal spray, Spray 1 Spray in nose 2 times a day., Disp: 1 Bottle, Rfl: 6  •  pravastatin (PRAVACHOL) 10 MG TABS, TAKE 1 TABLET BY MOUTH EVERY DAY., Disp: 30 Tab, Rfl: 10  •  Coenzyme Q10 (CO Q-10) 100 MG CAPS, Take 1 Cap by mouth 1 time daily as needed., Disp: 30 Each, Rfl: 0    PROBLEMS:  Patient Active Problem List    Diagnosis Date Noted   • Dyslipidemia 02/12/2013   • HTN (hypertension) 01/22/2013       REVIEW OF SYSTEMS:  Gen.:  No Nausea, Vomiting, fever, Chills.  Heart: No chest pain.  Lungs:  No shortness of Breath.  Psychological: Pantera unusual Anxiety depression     PHYSICAL EXAM   Constitutional: Alert, cooperative, not in acute distress.  Cardiovascular:  Rate Rhythm is  "regular without murmurs rubs clicks.     Thorax & Lungs: Clear to auscultation, no wheezing, rhonchi, or rales  HENT: Normocephalic, Atraumatic.  Eyes: PERRLA, EOMI, Conjunctiva normal.   Neck: Trachia is midline no swelling of the thyroid.   Lymphatic: No lymphadenopathy noted.   Abdomin: Soft non-tender, no rebound, no guarding.   Neurologic: Alert & oriented x 3, cranial nerves II through XII are intact, Normal motor function, Normal sensory function, No focal deficits noted.   Psychiatric: Affect normal, Judgment normal, Mood normal.     VITAL SIGNS:/78   Pulse 80   Temp 36.4 °C (97.5 °F)   Resp 18   Ht 1.803 m (5' 11\")   Wt 69 kg (152 lb 1.6 oz)   SpO2 98%   BMI 21.21 kg/m²     Labs: Reviewed    Assessment:                                                     Plan:    1. Diarrhea, unspecified type  Etiology uncertain  - REFERRAL TO GASTROENTEROLOGY  - COMPLETE O&P; Future  - CDIFF BY PCR; Future    2. Weight loss  Weight loss check labs  - COMP METABOLIC PANEL; Future  - CBC WITH DIFFERENTIAL; Future  - TSH; Future  - FREE THYROXINE; Future  - REFERRAL TO GASTROENTEROLOGY    3. Screening for colon cancer  Referral written  - REFERRAL TO GASTROENTEROLOGY    4. Screening cholesterol level  Screening orders written  - LIPID PROFILE; Future    5. Screening for prostate cancer  Check PSA  - PROSTATE SPECIFIC AG DIAGNOSTIC; Future        " [8] : 8 [Burning] : burning [Dull/Aching] : dull/aching [Radiating] : radiating [Stabbing] : stabbing [Throbbing] : throbbing [Tightness] : tightness [Tingling] : tingling [Intermittent] : intermittent [Household chores] : household chores [Leisure] : leisure [Sleep] : sleep [Physical therapy] : physical therapy [Injection therapy] : injection therapy [Walking] : walking [] : no [FreeTextEntry1] : bilateral knee pain  [FreeTextEntry7] : Bilateral feet  [FreeTextEntry6] : numbness and tingling in bilateral feet  [de-identified] : est 2017 [de-identified] : x-ray and MRI @[facility]iel

## 2024-12-04 ENCOUNTER — APPOINTMENT (OUTPATIENT)
Dept: MEDICAL GROUP | Facility: LAB | Age: 59
End: 2024-12-04
Payer: COMMERCIAL

## 2024-12-04 VITALS
HEART RATE: 63 BPM | DIASTOLIC BLOOD PRESSURE: 64 MMHG | SYSTOLIC BLOOD PRESSURE: 110 MMHG | TEMPERATURE: 97.3 F | BODY MASS INDEX: 20.76 KG/M2 | HEIGHT: 70 IN | RESPIRATION RATE: 16 BRPM | WEIGHT: 145 LBS | OXYGEN SATURATION: 99 %

## 2024-12-04 DIAGNOSIS — R93.1 AGATSTON CAC SCORE, >400: ICD-10-CM

## 2024-12-04 DIAGNOSIS — E78.5 DYSLIPIDEMIA: ICD-10-CM

## 2024-12-04 DIAGNOSIS — M79.644 FINGER PAIN, RIGHT: ICD-10-CM

## 2024-12-04 DIAGNOSIS — M67.449 GANGLION CYST OF FINGER: ICD-10-CM

## 2024-12-04 DIAGNOSIS — Z13.6 SCREENING FOR CARDIOVASCULAR CONDITION: ICD-10-CM

## 2024-12-04 PROCEDURE — 3078F DIAST BP <80 MM HG: CPT | Performed by: FAMILY MEDICINE

## 2024-12-04 PROCEDURE — 99214 OFFICE O/P EST MOD 30 MIN: CPT | Performed by: FAMILY MEDICINE

## 2024-12-04 PROCEDURE — 3074F SYST BP LT 130 MM HG: CPT | Performed by: FAMILY MEDICINE

## 2024-12-04 ASSESSMENT — FIBROSIS 4 INDEX: FIB4 SCORE: 1.15

## 2024-12-04 NOTE — PROGRESS NOTES
Verbal consent was acquired by the patient to use [employer] ambient listening note generation during this visit Yes    Subjective:   Jay Murphy is a 59 y.o. male here today for   Chief Complaint   Patient presents with    Lab Results     History of Present Illness  The patient is a 59-year-old male with a history of hyperlipidemia, currently on Lipitor 40 mg daily. He is here today to discuss other potential testing if needed.    He has undergone CT calcium scoring most recently on 07/05/2024, showing a total calcium score of 1555. His recent calcium score indicated a higher risk, which was not unexpected. He underwent a stress test and myocardial perfusion test, both of which yielded satisfactory results. He is curious about the accuracy of these tests, as he did not feel stressed during the procedures. He continues to take statins without any issues and is maintaining a healthy diet and regular exercise. He reports feeling well overall.    He has a cyst on his left ring finger that he frequently picks at, causing it to drain and occasionally become painful. The cyst has grown significantly over the past 6 months. He also suspects he may have arthritis in the same finger, although he does not recall any specific injury. The finger is somewhat floppy and lacks full extension, but he retains full function and can grasp objects. He has noticed that he often hits this finger, which has worsened over the past year. The finger is not usually painful, but it feels cold in colder weather.      Allergies   Allergen Reactions    Atropine Unspecified     white lips, swollen lips and swollen mouth, 5 y.o had a reaction          Current medicines (including changes today)  Current Outpatient Medications   Medication Sig Dispense Refill    atorvastatin (LIPITOR) 40 MG Tab Take 1 Tablet by mouth every evening for 360 days. 90 Tablet 3    aspirin 81 MG EC tablet Take 1 Tablet by mouth every day. 100 Tablet 3     "triamterene-hctz (MAXZIDE-25/DYAZIDE) 37.5-25 MG Tab Take 1 Tablet by mouth every day for 360 days. 90 Tablet 3    losartan (COZAAR) 50 MG Tab TAKE ONE TABLET BY MOUTH EVERY DAY 90 Tablet 3     No current facility-administered medications for this visit.     He  has a past medical history of Hyperlipidemia and Hypertension.    ROS   ROS  -See HPI     Objective:     Physical Exam:  /64   Pulse 63   Temp 36.3 °C (97.3 °F) (Temporal)   Resp 16   Ht 1.778 m (5' 10\")   Wt 65.8 kg (145 lb)   SpO2 99%  Body mass index is 20.81 kg/m².   Const: Vitals above. Well-appearing.  CV: Inspected/palpated for upper extremity edema. Bilateral radial pulses palpated, noting below if not 2+. Capillary refill evaluated distally, noting below if greater than 2 seconds.  Skin: Inspected for rash or lesions in area of concern.  Neuro/psych: Reflexes at brachioradialis (C5/6), biceps (C5/6), triceps (C7/8): N/A. 2-point discrimination assessed at 2nd finger (C6, median nerve), 3rd finger (C7, median nerve), 5th finger (C8, ulnar nerve), and dorsal web space between 1st/2nd digit (radial nerve). Phalen, Tinel's tests: Negative. Observed for normal mood/affect/insight.  MSK: normal gait. Posture: Remarkable. Examination of bilateral wrist/hand:  - Insepected/palpated bilaterally for warmth, upper extremity carriage, ulnar variance, and for deformity and tenderness of the proximal/distal radius and ulna, scaphoid/snuffbox, carpals, metacarpals, phalanges, carpal/CMC/MCP/IP joints, and TFCC.  - Assessed passive/active range of motion bilaterally with forearm pronation/supination, wrist flexion/extension, wrist radial/ulnar deviation, MCP flexion/extension/abduction/adduction, IP joint flexion/extension, and thumb flexion/extension/abduction/adduction/opposition, noting below for any pain or crepitation.  - Assessed muscle strength bilaterally with wrist flexion (C6/7, median/ulnar nerves), wrist extension (C7/8 radial nerve), finger " extension (C7, radial nerve), finger abduction (T1, ulnar nerve), and thumb opposition (median nerve), noting below if less than 5/5 or pain. Observed for muscle atrophy in thenar, hypothenar, and interosseus areas.  - Assessed stability bilaterally at the TFCC (piano key test) and wrist, carpal, scapho-lunate (Shuck test), metacarpal, and phalangeal joints. Varus/valgus stress at MCP/IP joints: Unremarkable. Finkelstein test negative.    All of the above were found to be normal, except:  Ganglion cyst noted on ring finger of left hand.  The fifth finger of of right hand does appear to have nodules at the DIP patient unable to extend 180 degrees from DIP.  Tenderness to palpation of DIP.    Results  Imaging  CT calcium scoring on 7/5/2024 showed a total calcium score of 1555. Nuclear med perfusion test showed normal results with no concerns.    Assessment and Plan:     Assessment & Plan  1. Hyperlipidemia.  Despite the elevated calcium score of 1555, his treadmill and nuclear cardiac stress tests showed normal results. However, the potential for false negatives in these tests was discussed, given his high-risk status. A CT angiogram was suggested as the next step before involving a cardiologist. He was advised to consider this and communicate his decision. His cholesterol levels will be rechecked next month to assess the effectiveness of the statins, and adjustments will be made if necessary. He continues to take Lipitor 40 mg daily.    2. Left ring finger cyst.  A cyst on the left ring finger has increased in size over the past six months and occasionally causes pain. A referral will be made to a hand specialist for further evaluation and potential surgical removal of the cyst.    3. Arthritis in left ring finger.  Arthritis is present in the left ring finger, likely due to a previous injury. A referral will be made to a hand specialist for further evaluation.      Orders:  1. Screening for cardiovascular  condition    2. Dyslipidemia  - Lipid Profile; Future    3. Agatston CAC score, >400  - Lipid Profile; Future    4. Ganglion cyst of finger  - Referral to Orthopedics    5. Finger pain, right  - Referral to Orthopedics        Followup: No follow-ups on file.         PLEASE NOTE: This dictation was created using voice recognition and [employer] ambient listening software. I have made every reasonable attempt to correct obvious errors, but I expect that there are errors of grammar and possibly content that I did not discover before finalizing the note.

## 2025-01-27 ENCOUNTER — PATIENT MESSAGE (OUTPATIENT)
Dept: MEDICAL GROUP | Facility: LAB | Age: 60
End: 2025-01-27
Payer: COMMERCIAL

## 2025-01-27 DIAGNOSIS — E78.5 DYSLIPIDEMIA: ICD-10-CM

## 2025-01-27 DIAGNOSIS — R93.1 AGATSTON CAC SCORE, >400: ICD-10-CM

## 2025-03-25 ENCOUNTER — HOSPITAL ENCOUNTER (OUTPATIENT)
Dept: LAB | Facility: MEDICAL CENTER | Age: 60
End: 2025-03-25
Attending: FAMILY MEDICINE
Payer: COMMERCIAL

## 2025-03-25 DIAGNOSIS — R93.1 AGATSTON CAC SCORE, >400: ICD-10-CM

## 2025-03-25 DIAGNOSIS — E78.5 DYSLIPIDEMIA: ICD-10-CM

## 2025-03-25 PROCEDURE — 80061 LIPID PANEL: CPT

## 2025-03-25 PROCEDURE — 36415 COLL VENOUS BLD VENIPUNCTURE: CPT

## 2025-03-26 LAB
CHOLEST SERPL-MCNC: 127 MG/DL (ref 100–199)
FASTING STATUS PATIENT QL REPORTED: NORMAL
HDLC SERPL-MCNC: 45 MG/DL
LDLC SERPL CALC-MCNC: 69 MG/DL
TRIGL SERPL-MCNC: 67 MG/DL (ref 0–149)

## 2025-03-31 ENCOUNTER — RESULTS FOLLOW-UP (OUTPATIENT)
Dept: MEDICAL GROUP | Facility: LAB | Age: 60
End: 2025-03-31

## 2025-04-13 DIAGNOSIS — I10 ESSENTIAL HYPERTENSION: ICD-10-CM

## 2025-04-14 NOTE — TELEPHONE ENCOUNTER
Received request via: Pharmacy    Was the patient seen in the last year in this department? Yes    Does the patient have an active prescription (recently filled or refills available) for medication(s) requested? No    [facility]    Does the patient have [facility] Plus and need 100-day supply? (This applies to ALL medications) Patient does not have SCP

## 2025-04-15 RX ORDER — TRIAMTERENE AND HYDROCHLOROTHIAZIDE 37.5; 25 MG/1; MG/1
1 TABLET ORAL
Qty: 90 TABLET | Refills: 3 | Status: SHIPPED | OUTPATIENT
Start: 2025-04-15

## 2025-04-15 RX ORDER — LOSARTAN POTASSIUM 50 MG/1
50 TABLET ORAL
Qty: 90 TABLET | Refills: 3 | Status: SHIPPED | OUTPATIENT
Start: 2025-04-15

## 2025-05-14 ENCOUNTER — TELEPHONE (OUTPATIENT)
Dept: MEDICAL GROUP | Facility: LAB | Age: 60
End: 2025-05-14
Payer: COMMERCIAL

## 2025-05-14 NOTE — TELEPHONE ENCOUNTER
[place] ([facility]) Fax # [phone]    Requesting form to be filled out of CT is still needed, please advise form in work basket looks like order is now .       CT angiography order update

## 2025-07-09 DIAGNOSIS — E78.5 DYSLIPIDEMIA: Primary | ICD-10-CM

## 2025-07-09 DIAGNOSIS — R93.1 AGATSTON CAC SCORE, >400: ICD-10-CM

## 2025-07-18 DIAGNOSIS — E78.5 DYSLIPIDEMIA: ICD-10-CM

## 2025-07-18 DIAGNOSIS — R93.1 AGATSTON CAC SCORE, >400: ICD-10-CM

## 2025-07-18 RX ORDER — ATORVASTATIN CALCIUM 40 MG/1
40 TABLET, FILM COATED ORAL EVERY EVENING
Qty: 90 TABLET | Refills: 1 | Status: SHIPPED | OUTPATIENT
Start: 2025-07-18